# Patient Record
Sex: FEMALE | Race: WHITE | NOT HISPANIC OR LATINO | Employment: FULL TIME | URBAN - METROPOLITAN AREA
[De-identification: names, ages, dates, MRNs, and addresses within clinical notes are randomized per-mention and may not be internally consistent; named-entity substitution may affect disease eponyms.]

---

## 2018-04-17 ENCOUNTER — APPOINTMENT (EMERGENCY)
Dept: RADIOLOGY | Facility: HOSPITAL | Age: 23
End: 2018-04-17
Payer: COMMERCIAL

## 2018-04-17 ENCOUNTER — HOSPITAL ENCOUNTER (EMERGENCY)
Facility: HOSPITAL | Age: 23
Discharge: HOME | End: 2018-04-17
Admitting: EMERGENCY MEDICINE
Payer: COMMERCIAL

## 2018-04-17 VITALS
SYSTOLIC BLOOD PRESSURE: 139 MMHG | WEIGHT: 120 LBS | OXYGEN SATURATION: 100 % | BODY MASS INDEX: 20.49 KG/M2 | DIASTOLIC BLOOD PRESSURE: 80 MMHG | HEART RATE: 104 BPM | RESPIRATION RATE: 18 BRPM | HEIGHT: 64 IN

## 2018-04-17 DIAGNOSIS — S52.501A DISTAL RADIUS FRACTURE, RIGHT: Primary | ICD-10-CM
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
## 2018-04-17 LAB — EXT PREG TEST URINE: NORMAL

## 2018-04-17 PROCEDURE — 99284 EMERGENCY DEPT VISIT MOD MDM: CPT

## 2018-04-17 PROCEDURE — 81025 URINE PREGNANCY TEST: CPT | Performed by: PHYSICIAN ASSISTANT

## 2018-04-17 PROCEDURE — 73110 X-RAY EXAM OF WRIST: CPT

## 2018-04-17 RX ORDER — ALBUTEROL SULFATE 90 UG/1
2 AEROSOL, METERED RESPIRATORY (INHALATION) EVERY 6 HOURS PRN
COMMUNITY
End: 2020-12-03 | Stop reason: ALTCHOICE
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
## 2018-04-18 ENCOUNTER — OFFICE VISIT (OUTPATIENT)
Dept: OBGYN CLINIC | Facility: CLINIC | Age: 23
End: 2018-04-18
Payer: COMMERCIAL

## 2018-04-18 VITALS
DIASTOLIC BLOOD PRESSURE: 72 MMHG | WEIGHT: 120 LBS | SYSTOLIC BLOOD PRESSURE: 116 MMHG | BODY MASS INDEX: 19.99 KG/M2 | HEART RATE: 80 BPM | HEIGHT: 65 IN

## 2018-04-18 DIAGNOSIS — S52.501A CLOSED TRAUMATIC NONDISPLACED FRACTURE OF DISTAL END OF RIGHT RADIUS, INITIAL ENCOUNTER: Primary | ICD-10-CM

## 2018-04-18 PROCEDURE — 29075 APPL CST ELBW FNGR SHORT ARM: CPT | Performed by: PHYSICIAN ASSISTANT

## 2018-04-18 PROCEDURE — 25600 CLTX DST RDL FX/EPHYS SEP WO: CPT | Performed by: PHYSICIAN ASSISTANT

## 2018-04-18 PROCEDURE — 99203 OFFICE O/P NEW LOW 30 MIN: CPT | Performed by: ORTHOPAEDIC SURGERY

## 2018-04-18 RX ORDER — ALBUTEROL SULFATE 90 UG/1
POWDER, METERED RESPIRATORY (INHALATION)
COMMUNITY
Start: 2018-01-23 | End: 2020-12-03 | Stop reason: ALTCHOICE
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
## 2018-04-18 NOTE — ASSESSMENT & PLAN NOTE
Findings consistent with right nondisplaced intra-articular distal radius fracture  Findings and treatment options were discussed with the patient  Short-arm cast was applied today by Dr Liss Andrea  Cast instructions were given  Continue ice, elevation and NSAIDs as needed  Follow-up in 6 weeks, out of cast and x-rays  All questions were answered to patient's satisfaction

## 2018-05-30 ENCOUNTER — OFFICE VISIT (OUTPATIENT)
Dept: OBGYN CLINIC | Facility: CLINIC | Age: 23
End: 2018-05-30

## 2018-05-30 ENCOUNTER — APPOINTMENT (OUTPATIENT)
Dept: RADIOLOGY | Facility: CLINIC | Age: 23
End: 2018-05-30
Payer: COMMERCIAL

## 2018-05-30 ENCOUNTER — OFFICE VISIT (OUTPATIENT)
Dept: OCCUPATIONAL THERAPY | Facility: CLINIC | Age: 23
End: 2018-05-30
Payer: COMMERCIAL

## 2018-05-30 VITALS
HEART RATE: 84 BPM | SYSTOLIC BLOOD PRESSURE: 116 MMHG | DIASTOLIC BLOOD PRESSURE: 78 MMHG | HEIGHT: 65 IN | WEIGHT: 120 LBS | BODY MASS INDEX: 19.99 KG/M2

## 2018-05-30 DIAGNOSIS — S52.501D CLOSED TRAUMATIC NONDISPLACED FRACTURE OF DISTAL END OF RIGHT RADIUS WITH ROUTINE HEALING, SUBSEQUENT ENCOUNTER: Primary | ICD-10-CM

## 2018-05-30 DIAGNOSIS — S62.101S WRIST FRACTURE, CLOSED, RIGHT, SEQUELA: ICD-10-CM

## 2018-05-30 PROCEDURE — L3906 WHO W/O JOINTS CF: HCPCS | Performed by: OCCUPATIONAL THERAPIST

## 2018-05-30 PROCEDURE — 99024 POSTOP FOLLOW-UP VISIT: CPT | Performed by: ORTHOPAEDIC SURGERY

## 2018-05-30 PROCEDURE — G8992 OTHER PT/OT  D/C STATUS: HCPCS | Performed by: OCCUPATIONAL THERAPIST

## 2018-05-30 PROCEDURE — G8991 OTHER PT/OT GOAL STATUS: HCPCS | Performed by: OCCUPATIONAL THERAPIST

## 2018-05-30 PROCEDURE — 73110 X-RAY EXAM OF WRIST: CPT

## 2018-05-30 PROCEDURE — G8990 OTHER PT/OT CURRENT STATUS: HCPCS | Performed by: OCCUPATIONAL THERAPIST

## 2018-05-30 NOTE — PROGRESS NOTES
Splint     Diagnosis:   1  Closed traumatic nondisplaced fracture of distal end of right radius with routine healing, subsequent encounter       Indication: Fracture    Location: Right  wrist  Supplies: Skin coverage   Splint type: Wrist splint  Wearing Schedule: off for hygeine and HEP  Describe Position: neutral    Precautions: Fracture    Patient or Caregiver expresses understanding of wearing Schedule and Precautions? Yes  Patient or Caregiver able to don/doff orthotic independently? Yes    Written orders provided to patient?  Yes  Orders Obtained: Written  Orders Obtained from: Dr Emili Chatman    Return for evaluation and treatment Yes

## 2018-05-30 NOTE — ASSESSMENT & PLAN NOTE
Findings consistent with right distal radius nondisplaced fracture  Discussed findings and treatment options with the patient  I have review patient's right wrist x-ray with her and her mother  Discontinue short-arm cast   Place patient in a volar splint  Start occupational therapy to rehabilitate her right wrist  Limit work activity using the right hand in lifting  Re-evaluate in 4-6 weeks  All patient's questions were answered to his satisfaction  This note is created using dictation transcription  It may contain typographical errors, grammatical errors, improperly dictated words, background noise and other errors
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
## 2018-05-31 ENCOUNTER — EVALUATION (OUTPATIENT)
Dept: OCCUPATIONAL THERAPY | Facility: CLINIC | Age: 23
End: 2018-05-31
Payer: COMMERCIAL

## 2018-05-31 DIAGNOSIS — S52.501D CLOSED TRAUMATIC NONDISPLACED FRACTURE OF DISTAL END OF RIGHT RADIUS WITH ROUTINE HEALING, SUBSEQUENT ENCOUNTER: Primary | ICD-10-CM

## 2018-05-31 PROCEDURE — 97165 OT EVAL LOW COMPLEX 30 MIN: CPT | Performed by: OCCUPATIONAL THERAPIST
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
## 2018-06-06 ENCOUNTER — OFFICE VISIT (OUTPATIENT)
Dept: OCCUPATIONAL THERAPY | Facility: CLINIC | Age: 23
End: 2018-06-06
Payer: COMMERCIAL

## 2018-06-06 DIAGNOSIS — S52.501D CLOSED TRAUMATIC NONDISPLACED FRACTURE OF DISTAL END OF RIGHT RADIUS WITH ROUTINE HEALING, SUBSEQUENT ENCOUNTER: Primary | ICD-10-CM

## 2018-06-06 PROCEDURE — 97140 MANUAL THERAPY 1/> REGIONS: CPT | Performed by: OCCUPATIONAL THERAPIST

## 2018-06-06 PROCEDURE — 97022 WHIRLPOOL THERAPY: CPT | Performed by: OCCUPATIONAL THERAPIST

## 2018-06-06 PROCEDURE — 97110 THERAPEUTIC EXERCISES: CPT | Performed by: OCCUPATIONAL THERAPIST

## 2018-06-06 NOTE — PROGRESS NOTES
Daily Note     Today's date: 2018  Patient name: Dora Sheikh  : 1995  MRN: [id number]  Referring provider: Junaid Zazueta MD  Dx:   Encounter Diagnosis     ICD-10-CM    1  Closed traumatic nondisplaced fracture of distal end of right radius with routine healing, subsequent encounter S52 501D                   Subjective: I am doing better      Objective: See treatment diary below      Assessment: Tolerated treatment well  Patient has improved ROM  Plan: Continue per plan of care            Daily Treatment Diary     Manual             graston wrist 3m 3m           Grade 1 MOB  3m Grade2  3M           MFR  2m                                         Exercise Diary             Wrist ROM x10 x10           P/S x10 x10           Powerweb  Red  3x10 Green  3x10           Pinch pins Red  3x6 Green  3x6           Wrist maze x5 x5           Wrist ROM w/cone 2x10 3x10                                                                                                                                                                                                     Modalities               pre 10m            fluido  10m

## 2018-06-08 ENCOUNTER — OFFICE VISIT (OUTPATIENT)
Dept: OCCUPATIONAL THERAPY | Facility: CLINIC | Age: 23
End: 2018-06-08
Payer: COMMERCIAL

## 2018-06-08 DIAGNOSIS — S52.501D CLOSED TRAUMATIC NONDISPLACED FRACTURE OF DISTAL END OF RIGHT RADIUS WITH ROUTINE HEALING, SUBSEQUENT ENCOUNTER: Primary | ICD-10-CM

## 2018-06-08 PROCEDURE — 97140 MANUAL THERAPY 1/> REGIONS: CPT | Performed by: OCCUPATIONAL THERAPIST

## 2018-06-08 PROCEDURE — 97022 WHIRLPOOL THERAPY: CPT | Performed by: OCCUPATIONAL THERAPIST

## 2018-06-08 PROCEDURE — 97110 THERAPEUTIC EXERCISES: CPT | Performed by: OCCUPATIONAL THERAPIST

## 2018-06-08 NOTE — PROGRESS NOTES
Daily Note     Today's date: 2018  Patient name: Glo Stiles  : 1995  MRN: [id number]  Referring provider: Debrah Ahumada, MD  Dx:   Encounter Diagnosis     ICD-10-CM    1  Closed traumatic nondisplaced fracture of distal end of right radius with routine healing, subsequent encounter S52 501D                   Subjective: I am doing  Better       Objective: See treatment diary below      Assessment: Tolerated treatment well  Patient has improve ROM   Plan: Continue per plan of care            Daily Treatment Diary     Manual            graston wrist 3m 3m 3          Grade 1 MOB  3m Grade2  3M Grade2  3m          MFR  2m 2m                                        Exercise Diary            Wrist ROM x10 x10 x10          P/S x10 x10 x10          Powerweb  Red  3x10 Green  3x10 [id number]          Pinch pins Red  3x6 Green  3x6 Green  3x6          Wrist maze x5 x5 x5          Wrist ROM w/cone 2x10 3x10 3x10                                                                                                                                                                                                    Modalities             MH pre 10m            fluido  10m

## 2018-06-13 ENCOUNTER — APPOINTMENT (OUTPATIENT)
Dept: OCCUPATIONAL THERAPY | Facility: CLINIC | Age: 23
End: 2018-06-13
Payer: COMMERCIAL

## 2018-06-14 ENCOUNTER — OFFICE VISIT (OUTPATIENT)
Dept: OCCUPATIONAL THERAPY | Facility: CLINIC | Age: 23
End: 2018-06-14
Payer: COMMERCIAL

## 2018-06-14 ENCOUNTER — APPOINTMENT (OUTPATIENT)
Dept: OCCUPATIONAL THERAPY | Facility: CLINIC | Age: 23
End: 2018-06-14
Payer: COMMERCIAL

## 2018-06-14 DIAGNOSIS — S52.501D CLOSED TRAUMATIC NONDISPLACED FRACTURE OF DISTAL END OF RIGHT RADIUS WITH ROUTINE HEALING, SUBSEQUENT ENCOUNTER: Primary | ICD-10-CM

## 2018-06-14 PROCEDURE — 97140 MANUAL THERAPY 1/> REGIONS: CPT | Performed by: OCCUPATIONAL THERAPIST

## 2018-06-14 PROCEDURE — 97022 WHIRLPOOL THERAPY: CPT | Performed by: OCCUPATIONAL THERAPIST

## 2018-06-14 PROCEDURE — 97110 THERAPEUTIC EXERCISES: CPT | Performed by: OCCUPATIONAL THERAPIST

## 2018-06-14 NOTE — PROGRESS NOTES
Daily Note     Today's date: 2018  Patient name: Constantino Codroba  : 1995  MRN: [id number]  Referring provider: Buck Mauro MD  Dx:   Encounter Diagnosis     ICD-10-CM    1  Closed traumatic nondisplaced fracture of distal end of right radius with routine healing, subsequent encounter S52 501D                   Subjective: " I can do everything but heavy lifting"  " It's moving well and I don't have any pain"      Objective: See treatment diary below    R  44# and left   58#  Handle two  Manual            graston wrist 3m 3m 3          Grade 1 MOB  3m Grade2  3M Grade2  3m          MFR  2m 2m                                        Exercise Diary            Wrist ROM x10 x10 x10 X 10         P/S x10 x10 x10 x10         Powerweb  Red  3x10 Green  3x10 [id number] Black  3x 01         Pinch pins Red  3x6 Green  3x6 Green  3x6 Blue/Black 2 x 10         Wrist maze x5 x5 x5 x5         Wrist ROM w/cone 2x10 3x10 3x10 defrred         digi flex    Green 3 x10                                                                                                                                                        6/                              Modalities   5           MH pre 10m            fluido  10m 10                                       Assessment: Tolerated treatment well  Patient would benefit from continued OT      Plan: Continue per plan of care

## 2018-06-15 ENCOUNTER — APPOINTMENT (OUTPATIENT)
Dept: OCCUPATIONAL THERAPY | Facility: CLINIC | Age: 23
End: 2018-06-15
Payer: COMMERCIAL

## 2018-06-15 ENCOUNTER — OFFICE VISIT (OUTPATIENT)
Dept: OCCUPATIONAL THERAPY | Facility: CLINIC | Age: 23
End: 2018-06-15
Payer: COMMERCIAL

## 2018-06-15 DIAGNOSIS — S52.501D CLOSED TRAUMATIC NONDISPLACED FRACTURE OF DISTAL END OF RIGHT RADIUS WITH ROUTINE HEALING, SUBSEQUENT ENCOUNTER: Primary | ICD-10-CM

## 2018-06-15 PROCEDURE — 97140 MANUAL THERAPY 1/> REGIONS: CPT | Performed by: OCCUPATIONAL THERAPIST

## 2018-06-15 PROCEDURE — 97110 THERAPEUTIC EXERCISES: CPT | Performed by: OCCUPATIONAL THERAPIST

## 2018-06-15 PROCEDURE — 97022 WHIRLPOOL THERAPY: CPT | Performed by: OCCUPATIONAL THERAPIST

## 2018-06-15 NOTE — PROGRESS NOTES
Daily Note     Today's date: 6/15/2018  Patient name: Vianca Estrada  : 1995  MRN: [id number]  Referring provider: Yohannes Jaquez MD  Dx:   Encounter Diagnosis     ICD-10-CM    1  Closed traumatic nondisplaced fracture of distal end of right radius with routine healing, subsequent encounter S52 501D                   Subjective: " I want to start wearing the splint less "      Objective: See treatment diary below    R  44# and left   58#  Handle two  Manual  5/31 6/6 6/8 6/14 6/15        graston wrist 3m 3m 3  3 min        Grade 1 MOB  3m Grade2  3M Grade2  3m  3 min        MFR  2m 2m  2min                                      Exercise Diary  5/31 6/6 6/8 6/14 6/15        Wrist ROM x10 x10 x10 X 10 x10        P/S x10 x10 x10 x10 x10        Powerweb  Red  3x10 Green  3x10 [id number] Black  3x 01 Black x30        Pinch pins Red  3x6 Green  3x6 Green  3x6 Blue/Black 2 x 10 Black/Blue x3        Wrist maze x5 x5 x5 x5         Wrist ROM w/cone 2x10 3x10 3x10 defrred #2 3x10        digi flex    Green 3 x10         P/s therabar     Red x30                                                                                                                                                                        Modalities  5/31 5/8   6/15        MH pre 10m            fluido  10m 10  10 min                                     Assessment: Tolerated treatment well  Patient would benefit from continued OT      Plan: Continue per plan of care

## 2018-06-27 ENCOUNTER — OFFICE VISIT (OUTPATIENT)
Dept: OCCUPATIONAL THERAPY | Facility: CLINIC | Age: 23
End: 2018-06-27
Payer: COMMERCIAL

## 2018-06-27 DIAGNOSIS — S52.501D CLOSED TRAUMATIC NONDISPLACED FRACTURE OF DISTAL END OF RIGHT RADIUS WITH ROUTINE HEALING, SUBSEQUENT ENCOUNTER: Primary | ICD-10-CM

## 2018-06-27 PROCEDURE — 97110 THERAPEUTIC EXERCISES: CPT | Performed by: OCCUPATIONAL THERAPIST

## 2018-06-27 PROCEDURE — G8990 OTHER PT/OT CURRENT STATUS: HCPCS | Performed by: OCCUPATIONAL THERAPIST

## 2018-06-27 PROCEDURE — G8991 OTHER PT/OT GOAL STATUS: HCPCS | Performed by: OCCUPATIONAL THERAPIST

## 2018-06-27 PROCEDURE — 97140 MANUAL THERAPY 1/> REGIONS: CPT | Performed by: OCCUPATIONAL THERAPIST

## 2018-06-27 NOTE — PROGRESS NOTES
Daily Note     Today's date: 2018  Patient name: Adrian Hill  : 1995  MRN: [id number]  Referring provider: Eula Machado MD  Dx:   Encounter Diagnosis     ICD-10-CM    1  Closed traumatic nondisplaced fracture of distal end of right radius with routine healing, subsequent encounter S52 501D                   Subjective: I am doing better      Objective: See treatment diary below        Manual  5/31 6/6 6/8 6/14 6/15 6/27       graston wrist 3m 3m 3  3 min 3m       Grade 1 MOB  3m Grade2  3M Grade2  3m  3 min Grade 3  3m       MFR  2m 2m  2min 2m                                     Exercise Diary  5/31 6/6 6/8 6/14 6/15 6/27       Wrist ROM x10 x10 x10 X 10 x10 X10       P/S x10 x10 x10 x10 x10 x10       Powerweb  Red  3x10 Green  3x10 [id number] Black  3x 01 Black x30 Black   x30       Pinch pins Red  3x6 Green  3x6 Green  3x6 Blue/Black 2 x 10 Black/Blue x3 Black  x30       Wrist maze x5 x5 x5 x5         Wrist ROM w/cone 2x10 3x10 3x10 defrred #2 3x10 3#  3x10       digi flex    Green 3 x10         P/s therabar     Red x30 Red   x30                                                                                                                                                                       Modalities  5/31 5/8   6/15 6/27       MH pre 10m     10m       fluido  10m 10  10 min                           Assessment: Tolerated treatment well  Patient has improved ROM   She has improved use R for ADL and work        Plan: upgrade as onelia

## 2018-06-29 ENCOUNTER — OFFICE VISIT (OUTPATIENT)
Dept: OCCUPATIONAL THERAPY | Facility: CLINIC | Age: 23
End: 2018-06-29
Payer: COMMERCIAL

## 2018-06-29 DIAGNOSIS — S52.501D CLOSED TRAUMATIC NONDISPLACED FRACTURE OF DISTAL END OF RIGHT RADIUS WITH ROUTINE HEALING, SUBSEQUENT ENCOUNTER: Primary | ICD-10-CM

## 2018-06-29 PROCEDURE — 97110 THERAPEUTIC EXERCISES: CPT | Performed by: OCCUPATIONAL THERAPIST

## 2018-06-29 PROCEDURE — 97010 HOT OR COLD PACKS THERAPY: CPT | Performed by: OCCUPATIONAL THERAPIST

## 2018-06-29 PROCEDURE — 97140 MANUAL THERAPY 1/> REGIONS: CPT | Performed by: OCCUPATIONAL THERAPIST

## 2018-06-29 NOTE — PROGRESS NOTES
Daily Note     Today's date: 2018  Patient name: China Stone  : 1995  MRN: [id number]  Referring provider: Trina Miranda MD  Dx:   Encounter Diagnosis     ICD-10-CM    1  Closed traumatic nondisplaced fracture of distal end of right radius with routine healing, subsequent encounter S52 501D                   Subjective: I am doing better      Objective: See treatment diary below        Manual  5/31 6/6 6/8 6/14 6/15 6/27 6/29      graston wrist 3m 3m 3  3 min 3m 3m      Grade 1 MOB  3m Grade2  3M Grade2  3m  3 min Grade 3  3m 3m      MFR  2m 2m  2min 2m 2m                                    Exercise Diary  5/31 6/6 6/8 6/14 6/15 6/27 6/29      Wrist ROM x10 x10 x10 X 10 x10 X10 x10      P/S x10 x10 x10 x10 x10 x10 x10      Powerweb  Red  3x10 Green  3x10 [id number] Black  3x 01 Black x30 Black   x30 Black x30      Pinch pins Red  3x6 Green  3x6 Green  3x6 Blue/Black 2 x 10 Black/Blue x3 Black  x30 Black x30      Wrist maze x5 x5 x5 x5         Wrist ROM w/cone 2x10 3x10 3x10 defrred #2 3x10 3#  3x10 #3 3x10      digi flex    Green 3 x10         P/s therabar     Red x30 Red   x30 Green x30      ext       Red/green bandsx2                                                                                                                                                         Modalities  5/31 5/8   6/15 6/27 6/29      MH pre 10m     10m       fluido  10m 10  10 min  10min                         Assessment: Tolerated treatment well  Patient has improved ROM   She has improved use R for ADL and work        Plan: upgrade as onelia

## 2018-07-03 ENCOUNTER — OFFICE VISIT (OUTPATIENT)
Dept: OCCUPATIONAL THERAPY | Facility: CLINIC | Age: 23
End: 2018-07-03
Payer: COMMERCIAL

## 2018-07-03 DIAGNOSIS — S52.501D CLOSED TRAUMATIC NONDISPLACED FRACTURE OF DISTAL END OF RIGHT RADIUS WITH ROUTINE HEALING, SUBSEQUENT ENCOUNTER: Primary | ICD-10-CM

## 2018-07-03 PROCEDURE — 97110 THERAPEUTIC EXERCISES: CPT | Performed by: OCCUPATIONAL THERAPIST

## 2018-07-03 PROCEDURE — 97022 WHIRLPOOL THERAPY: CPT | Performed by: OCCUPATIONAL THERAPIST

## 2018-07-03 PROCEDURE — 97140 MANUAL THERAPY 1/> REGIONS: CPT | Performed by: OCCUPATIONAL THERAPIST

## 2018-07-03 NOTE — PROGRESS NOTES
Daily Note     Today's date: 7/3/2018  Patient name: Juan C García  : 1995  MRN: [id number]  Referring provider: Sunita Perez MD  Dx:   Encounter Diagnosis     ICD-10-CM    1  Closed traumatic nondisplaced fracture of distal end of right radius with routine healing, subsequent encounter S52 501D                   Subjective: I am doing better , difficulty  With heavy lifting      Objective: See treatment diary below      Assessment: Tolerated treatment well  Patient has improved ROM and strength   Plan: Continue per plan of care            Manual  5/31 6/6 6/8 6/14 6/15 6/27 7/3      graston wrist 3m 3m 3  3 min 3m 3m      Grade 1 MOB  3m Grade2  3M Grade2  3m  3 min Grade 3  3m Grade 3  3m      MFR  2m 2m  2min 2m 2m                                    Exercise Diary  5/31 6/6 6/8 6/14 6/15 6/27 7/3      Wrist ROM x10 x10 x10 X 10 x10 X10 x10      P/S x10 x10 x10 x10 x10 x10 x10      Powerweb  Red  3x10 Green  3x10 [id number] Black  3x 01 Black x30 Black   x30 Black   x30      Pinch pins Red  3x6 Green  3x6 Green  3x6 Blue/Black 2 x 10 Black/Blue x3 Black  x30 Black  x30      Wrist maze x5 x5 x5 x5         Wrist ROM w/cone 2x10 3x10 3x10 defrred #2 3x10 3#  3x10 3#  3x10      digi flex    Green 3 x10         P/s therabar     Red x30 Red   x30 Red  x30      theraband row       OR  3x10      Theraband ext       OR  3x10                                                                                                              /                              Modalities  5/31 5/8   6/15 6/27 7/3      MH pre 10m     10m       fluido  10m 10  10 min  10m

## 2018-07-06 ENCOUNTER — OFFICE VISIT (OUTPATIENT)
Dept: OCCUPATIONAL THERAPY | Facility: CLINIC | Age: 23
End: 2018-07-06
Payer: COMMERCIAL

## 2018-07-06 DIAGNOSIS — S52.501D CLOSED TRAUMATIC NONDISPLACED FRACTURE OF DISTAL END OF RIGHT RADIUS WITH ROUTINE HEALING, SUBSEQUENT ENCOUNTER: Primary | ICD-10-CM

## 2018-07-06 PROCEDURE — 97110 THERAPEUTIC EXERCISES: CPT | Performed by: OCCUPATIONAL THERAPIST

## 2018-07-06 PROCEDURE — 97140 MANUAL THERAPY 1/> REGIONS: CPT | Performed by: OCCUPATIONAL THERAPIST

## 2018-07-06 PROCEDURE — 97022 WHIRLPOOL THERAPY: CPT | Performed by: OCCUPATIONAL THERAPIST

## 2018-07-06 NOTE — PROGRESS NOTES
Daily Note     Today's date: 2018  Patient name: Elizabeth Wong  : 1995  MRN: [id number]  Referring provider: Ame Holder MD  Dx:   Encounter Diagnosis     ICD-10-CM    1  Closed traumatic nondisplaced fracture of distal end of right radius with routine healing, subsequent encounter S52 501D                   Subjective: I am doing better      Objective: See treatment diary below          Manual  5/31 6/6 6/8 6/14 6/15 6/27 7/3 7     graston wrist 3m 3m 3  3 min 3m 3m 3m     Grade 1 MOB  3m Grade2  3M Grade2  3m  3 min Grade 3  3m Grade 3  3m 3m     MFR  2m 2m  2min 2m 2m 2m                                   Exercise Diary  5/31 6/6 6/8 6/14 6/15 6/27 7/3 7     Wrist ROM x10 x10 x10 X 10 x10 X10 x10 x10     P/S x10 x10 x10 x10 x10 x10 x10 x10     Powerweb  Red  3x10 Green  3x10 [id number] Black  3x 01 Black x30 Black   x30 Black   x30 Black  3x10     Pinch pins Red  3x6 Green  3x6 Green  3x6 Blue/Black 2 x 10 Black/Blue x3 Black  x30 Black  x30 Black 3x10     Wrist maze x5 x5 x5 x5         Wrist ROM w/cone 2x10 3x10 3x10 defrred #2 3x10 3#  3x10 3#  3x10 3#  3x10     digi flex    Green 3 x10         P/s therabar     Red x30 Red   x30 Red  x30 Green  x30     theraband row       OR  3x10 Green  3x10     Theraband ext       OR  3x10 Green  3x10     Biceps curl        5#  3x10     Box lift         14#  3x10                                                                                   /                              Modalities  5/31 5/8   6/15 6/27 7/3 7/6      pre 10m     10m       fluido  10m 10  10 min  10m 10m                            Assessment: Tolerated treatment well  Patient remains limited with heavier lifting at work        Plan: ankur

## 2018-07-10 ENCOUNTER — OFFICE VISIT (OUTPATIENT)
Dept: OCCUPATIONAL THERAPY | Facility: CLINIC | Age: 23
End: 2018-07-10
Payer: COMMERCIAL

## 2018-07-10 DIAGNOSIS — S52.501D CLOSED TRAUMATIC NONDISPLACED FRACTURE OF DISTAL END OF RIGHT RADIUS WITH ROUTINE HEALING, SUBSEQUENT ENCOUNTER: Primary | ICD-10-CM

## 2018-07-10 PROCEDURE — G8990 OTHER PT/OT CURRENT STATUS: HCPCS | Performed by: OCCUPATIONAL THERAPIST

## 2018-07-10 PROCEDURE — 97022 WHIRLPOOL THERAPY: CPT | Performed by: OCCUPATIONAL THERAPIST

## 2018-07-10 PROCEDURE — 97110 THERAPEUTIC EXERCISES: CPT | Performed by: OCCUPATIONAL THERAPIST

## 2018-07-10 PROCEDURE — G8991 OTHER PT/OT GOAL STATUS: HCPCS | Performed by: OCCUPATIONAL THERAPIST

## 2018-07-10 PROCEDURE — 97140 MANUAL THERAPY 1/> REGIONS: CPT | Performed by: OCCUPATIONAL THERAPIST
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
## 2018-07-11 ENCOUNTER — OFFICE VISIT (OUTPATIENT)
Dept: OBGYN CLINIC | Facility: CLINIC | Age: 23
End: 2018-07-11

## 2018-07-11 VITALS
DIASTOLIC BLOOD PRESSURE: 70 MMHG | HEIGHT: 65 IN | BODY MASS INDEX: 19.66 KG/M2 | HEART RATE: 74 BPM | WEIGHT: 118 LBS | SYSTOLIC BLOOD PRESSURE: 118 MMHG

## 2018-07-11 DIAGNOSIS — S52.501D CLOSED TRAUMATIC NONDISPLACED FRACTURE OF DISTAL END OF RIGHT RADIUS WITH ROUTINE HEALING, SUBSEQUENT ENCOUNTER: Primary | ICD-10-CM

## 2018-07-11 PROCEDURE — 99024 POSTOP FOLLOW-UP VISIT: CPT | Performed by: ORTHOPAEDIC SURGERY

## 2018-07-11 NOTE — ASSESSMENT & PLAN NOTE
Heal right distal radius fracture  I recommend patient to finish her hand therapy treatment  Continue home exercises  I advised patient to contact me if her symptoms persist or worsen, otherwise I will see patient back as needed  All patient's questions were answered to her satisfaction  This note is created using dictation transcription  It may contain typographical errors, grammatical errors, improperly dictated words, background noise and other errors

## 2018-07-12 ENCOUNTER — OFFICE VISIT (OUTPATIENT)
Dept: OCCUPATIONAL THERAPY | Facility: CLINIC | Age: 23
End: 2018-07-12
Payer: COMMERCIAL

## 2018-07-12 DIAGNOSIS — S52.501D CLOSED TRAUMATIC NONDISPLACED FRACTURE OF DISTAL END OF RIGHT RADIUS WITH ROUTINE HEALING, SUBSEQUENT ENCOUNTER: Primary | ICD-10-CM

## 2018-07-12 PROCEDURE — 97110 THERAPEUTIC EXERCISES: CPT | Performed by: OCCUPATIONAL THERAPIST

## 2018-07-12 PROCEDURE — 97022 WHIRLPOOL THERAPY: CPT | Performed by: OCCUPATIONAL THERAPIST

## 2018-07-12 PROCEDURE — 97112 NEUROMUSCULAR REEDUCATION: CPT | Performed by: OCCUPATIONAL THERAPIST

## 2018-07-12 NOTE — PROGRESS NOTES
Daily Note     Today's date: 2018  Patient name: Vianca Estrada  : 1995  MRN: [id number]  Referring provider: Yohannes Jaquez MD  Dx:   Encounter Diagnosis     ICD-10-CM    1  Closed traumatic nondisplaced fracture of distal end of right radius with routine healing, subsequent encounter S52 501D                   Subjective: I am doing  Better       Objective: See treatment diary below            Manual  5/31 6/6 6/8 6/14 6/15 6/27 7/3 7/6 7/9 7/12   graston wrist 3m 3m 3  3 min 3m 3m 3m 3m 3m   Grade 1 MOB  3m Grade2  3M Grade2  3m  3 min Grade 3  3m Grade 3  3m 3m 3m 3m   MFR  2m 2m  2min 2m 2m 2m 2m 2m                                 Exercise Diary  5/31 6/6 6/8 6/14 6/15 6/27 7/3 7/6 7/10 7/12    Wrist ROM x10 x10 x10 X 10 x10 X10 x10 x10 x10 x10   P/S x10 x10 x10 x10 x10 x10 x10 x10 x10 x10   Powerweb  Red  3x10 Green  3x10 [id number] Black  3x 01 Black x30 Black   x30 Black   x30 Black  3x10 Black 3x10 Black   3x10   Pinch pins Red  3x6 Green  3x6 Green  3x6 Blue/Black 2 x 10 Black/Blue x3 Black  x30 Black  x30 Black 3x10 Black 3x10 Black  3x10   Wrist maze x5 x5 x5 x5         Wrist ROM w/cone 2x10 3x10 3x10 defrred #2 3x10 3#  3x10 3#  3x10 3#  3x10 3#  3x10 4#  3x10   digi flex    Green 3 x10         P/s therabar     Red x30 Red   x30 Red  x30 Green  x30 Green x30 Green  x30   theraband row       OR  3x10 Green  3x10 Green  3x10 Green  3x10   Theraband ext       OR  3x10 Green  3x10 Green 3x10 Green  3x10   Biceps curl        5#  3x10 7#  3x10 7 5#kb  3x10   Box lift         14#  3x10 15 #  3x10 15#  3x10                                                                                 /                              Modalities  5/31 5/8   6/15 6/27 7/3 7/6 7/10 7/12   MH pre 10m     10m       fluido  10m 10  10 min  10m 10m 10m 10m                            Assessment: Tolerated treatment well  Patient has improved use R for ADL and work        Plan: as needed

## 2018-07-18 ENCOUNTER — OFFICE VISIT (OUTPATIENT)
Dept: OCCUPATIONAL THERAPY | Facility: CLINIC | Age: 23
End: 2018-07-18
Payer: COMMERCIAL

## 2018-07-18 DIAGNOSIS — S52.501D CLOSED TRAUMATIC NONDISPLACED FRACTURE OF DISTAL END OF RIGHT RADIUS WITH ROUTINE HEALING, SUBSEQUENT ENCOUNTER: Primary | ICD-10-CM

## 2018-07-18 PROCEDURE — 97022 WHIRLPOOL THERAPY: CPT

## 2018-07-18 PROCEDURE — 97110 THERAPEUTIC EXERCISES: CPT

## 2018-07-18 NOTE — PROGRESS NOTES
Daily Note     Today's date: 2018  Patient name: Constantino Cordoba  : 1995  MRN: [id number]  Referring provider: Buck Mauro MD  Dx:   Encounter Diagnosis     ICD-10-CM    1  Closed traumatic nondisplaced fracture of distal end of right radius with routine healing, subsequent encounter S52 501D                   Subjective: I am doing better  I just need to get stronger        Objective: See treatment diary below            Manual  5/31 6/6 6/8 6/14 6/15 6/27 7/3 7/6 7/9 7/12 7/18   graston wrist 3m 3m 3  3 min 3m 3m 3m 3m 3m    Grade 1 MOB  3m Grade2  3M Grade2  3m  3 min Grade 3  3m Grade 3  3m 3m 3m 3m 3min   MFR  2m 2m  2min 2m 2m 2m 2m 2m 2min                                                 Exercise Diary  5/31 6/6 6/8 6/14 6/15 6/27 7/3 7/6 7/10 7/12  7/18   Wrist ROM x10 x10 x10 X 10 x10 X10 x10 x10 x10 x10 x10   P/S x10 x10 x10 x10 x10 x10 x10 x10 x10 x10 x10   Powerweb  Red  3x10 Green  3x10 [id number] Black  3x 01 Black x30 Black   x30 Black   x30 Black  3x10 Black 3x10 Black   3x10 Black 3x10   Pinch pins Red  3x6 Green  3x6 Green  3x6 Blue/Black 2 x 10 Black/Blue x3 Black  x30 Black  x30 Black 3x10 Black 3x10 Black  3x10 Black 3x10   Wrist maze x5 x5 x5 x5          Wrist ROM w/cone 2x10 3x10 3x10 defrred #2 3x10 3#  3x10 3#  3x10 3#  3x10 3#  3x10 4#  3x10 4# 3x10   digi flex    Green 3 x10          P/s therabar     Red x30 Red   x30 Red  x30 Green  x30 Green x30 Green  x30 Green 30x   theraband row       OR  3x10 Green  3x10 Green  3x10 Green  3x10    Theraband ext       OR  3x10 Green  3x10 Green 3x10 Green  3x10    Biceps curl        5#  3x10 7#  3x10 7 5#kb  3x10    Box lift         14#  3x10 15 #  3x10 15#  3x10                                                                                                                        Modalities  5/31 5/8   6/15 6/27 7/3 7/6 7/10 7/12 7/18    pre 10m     10m        fluido  10m 10  10 min  10m 10m 10m 10m 10min Assessment: Tolerated treatment well  Patient reports improving functional use of RUE for ADLs and work tasks, but strength is still limited  Noted extrinsic extensor tightness  Plan: Progress treatment as tolerated  Continue for strengthening

## 2018-07-24 ENCOUNTER — OFFICE VISIT (OUTPATIENT)
Dept: OCCUPATIONAL THERAPY | Facility: CLINIC | Age: 23
End: 2018-07-24
Payer: COMMERCIAL

## 2018-07-24 DIAGNOSIS — S52.501D CLOSED TRAUMATIC NONDISPLACED FRACTURE OF DISTAL END OF RIGHT RADIUS WITH ROUTINE HEALING, SUBSEQUENT ENCOUNTER: Primary | ICD-10-CM

## 2018-07-24 PROCEDURE — 97110 THERAPEUTIC EXERCISES: CPT | Performed by: OCCUPATIONAL THERAPIST

## 2018-07-24 PROCEDURE — 97140 MANUAL THERAPY 1/> REGIONS: CPT | Performed by: OCCUPATIONAL THERAPIST

## 2018-07-24 PROCEDURE — 97010 HOT OR COLD PACKS THERAPY: CPT | Performed by: OCCUPATIONAL THERAPIST

## 2018-07-24 NOTE — PROGRESS NOTES
Daily Note     Today's date: 2018  Patient name: Jarrell Triana  : 1995  MRN: [id number]  Referring provider: Laura Martinez MD  Dx:   Encounter Diagnosis     ICD-10-CM    1  Closed traumatic nondisplaced fracture of distal end of right radius with routine healing, subsequent encounter S53 501D                   Subjective: I am doing better  I just need to get stronger        Objective: See treatment diary below      Manual               graston wrist              Grade 1 MOB  3m             MFR 2m                                                           Exercise Diary               Wrist ROM x10             P/S x10             Powerweb  Black x10             Pinch pins Black x3             Wrist maze              Wrist ROM w/cone #4 3x10             digi flex Green bands x3             P/s therabar Green x30             theraband row Green 3x10             Theraband ext Green 3x10             Biceps curl 7 5 KB x30             Box lift  15# 3x10                                                                                                                                 Modalities               MH pre 10m             fluido                                    Manual  5/31 6/6 6/8 6/14 6/15 6/27 7/3 7/6 7/9 7/12 7/18   graston wrist 3m 3m 3  3 min 3m 3m 3m 3m 3m    Grade 1 MOB  3m Grade2  3M Grade2  3m  3 min Grade 3  3m Grade 3  3m 3m 3m 3m 3min   MFR  2m 2m  2min 2m 2m 2m 2m 2m 2min                                                 Exercise Diary  5/31 6/6 6/8 6/14 6/15 6/27 7/3 7/6 7/10 7/12  7/18   Wrist ROM x10 x10 x10 X 10 x10 X10 x10 x10 x10 x10 x10   P/S x10 x10 x10 x10 x10 x10 x10 x10 x10 x10 x10   Powerweb  Red  3x10 Green  3x10 [id number] Black  3x 01 Black x30 Black   x30 Black   x30 Black  3x10 Black 3x10 Black   3x10 Black 3x10   Pinch pins Red  3x6 Green  3x6 Green  3x6 Blue/Black 2 x 10 Black/Blue x3 Black  x30 Black  x30 Black 3x10 Black 3x10 Black  3x10 Black 3x10 Wrist maze x5 x5 x5 x5          Wrist ROM w/cone 2x10 3x10 3x10 defrred #2 3x10 3#  3x10 3#  3x10 3#  3x10 3#  3x10 4#  3x10 4# 3x10   digi flex    Green 3 x10          P/s therabar     Red x30 Red   x30 Red  x30 Green  x30 Green x30 Green  x30 Green 30x   theraband row       OR  3x10 Green  3x10 Green  3x10 Green  3x10    Theraband ext       OR  3x10 Green  3x10 Green 3x10 Green  3x10    Biceps curl        5#  3x10 7#  3x10 7 5#kb  3x10    Box lift         14#  3x10 15 #  3x10 15#  3x10                                                                                        6/8                                Modalities  5/31 5/8   6/15 6/27 7/3 7/6 7/10 7/12 7/18   MH pre 10m     10m        fluido  10m 10  10 min  10m 10m 10m 10m 10min                             Assessment: Tolerated treatment well  Patient reports improving functional use of RUE for ADLs and work tasks, but strength is still limited  Noted extrinsic extensor tightness  Plan: Progress treatment as tolerated  Continue for strengthening

## 2018-07-25 ENCOUNTER — APPOINTMENT (OUTPATIENT)
Dept: OCCUPATIONAL THERAPY | Facility: CLINIC | Age: 23
End: 2018-07-25
Payer: COMMERCIAL

## 2018-08-01 ENCOUNTER — APPOINTMENT (OUTPATIENT)
Dept: OCCUPATIONAL THERAPY | Facility: CLINIC | Age: 23
End: 2018-08-01
Payer: COMMERCIAL

## 2018-08-03 ENCOUNTER — OFFICE VISIT (OUTPATIENT)
Dept: OCCUPATIONAL THERAPY | Facility: CLINIC | Age: 23
End: 2018-08-03
Payer: COMMERCIAL

## 2018-08-03 DIAGNOSIS — S52.501D CLOSED TRAUMATIC NONDISPLACED FRACTURE OF DISTAL END OF RIGHT RADIUS WITH ROUTINE HEALING, SUBSEQUENT ENCOUNTER: Primary | ICD-10-CM

## 2018-08-03 PROCEDURE — G8992 OTHER PT/OT  D/C STATUS: HCPCS | Performed by: OCCUPATIONAL THERAPIST

## 2018-08-03 PROCEDURE — G8991 OTHER PT/OT GOAL STATUS: HCPCS | Performed by: OCCUPATIONAL THERAPIST

## 2018-08-03 PROCEDURE — 97110 THERAPEUTIC EXERCISES: CPT | Performed by: OCCUPATIONAL THERAPIST

## 2018-08-03 PROCEDURE — 97140 MANUAL THERAPY 1/> REGIONS: CPT | Performed by: OCCUPATIONAL THERAPIST

## 2018-08-03 PROCEDURE — 97010 HOT OR COLD PACKS THERAPY: CPT | Performed by: OCCUPATIONAL THERAPIST

## 2018-08-03 NOTE — PROGRESS NOTES
Daily Note     Today's date: 8/3/2018  Patient name: Karen Rodriguez  : 1995  MRN: [id number]  Referring provider: Roberto Stevenson MD  Dx:   Encounter Diagnosis     ICD-10-CM    1  Closed traumatic nondisplaced fracture of distal end of right radius with routine healing, subsequent encounter S52 501D                   Subjective: I can do everything now        Objective: See treatment diary below      Manual  7/24 8/3            graston wrist  4min            Grade 1 MOB  3m 4min            MFR 2m                                                           Exercise Diary  7/24 8/3            Wrist ROM x10 x10            P/S x10 x10            Powerweb  Black x10 Black x10            Pinch pins Black x3 Black x3            Wrist maze              Wrist ROM w/cone #4 3x10 #4 3x10            digi flex Green bands x3 Green bands x3            P/s therabar Green x30 Green x30            theraband row Green 3x10             Theraband ext Green 3x10             Biceps curl 7 5 KB x30             Box lift  15# 3x10 15# x30                                                                                                                                Modalities  7/24 8/3            MH pre 10m 10 min            fluido                                    Manual  5/31 6/6 6/8 6/14 6/15 6/27 7/3 7/6 7/9 7/12 7/18   graston wrist 3m 3m 3  3 min 3m 3m 3m 3m 3m    Grade 1 MOB  3m Grade2  3M Grade2  3m  3 min Grade 3  3m Grade 3  3m 3m 3m 3m 3min   MFR  2m 2m  2min 2m 2m 2m 2m 2m 2min                                                 Exercise Diary  5/31 6/6 6/8 6/14 6/15 6/27 7/3 7/6 7/10 7/12  7/18   Wrist ROM x10 x10 x10 X 10 x10 X10 x10 x10 x10 x10 x10   P/S x10 x10 x10 x10 x10 x10 x10 x10 x10 x10 x10   Powerweb  Red  3x10 Green  3x10 [id number] Black  3x 01 Black x30 Black   x30 Black   x30 Black  3x10 Black 3x10 Black   3x10 Black 3x10   Pinch pins Red  3x6 Green  3x6 Green  3x6 Blue/Black 2 x 10 Black/Blue x3 Black  x30 Black  x30 Black 3x10 Black 3x10 Black  3x10 Black 3x10   Wrist maze x5 x5 x5 x5          Wrist ROM w/cone 2x10 3x10 3x10 defrred #2 3x10 3#  3x10 3#  3x10 3#  3x10 3#  3x10 4#  3x10 4# 3x10   digi flex    Green 3 x10          P/s therabar     Red x30 Red   x30 Red  x30 Green  x30 Green x30 Green  x30 Green 30x   theraband row       OR  3x10 Green  3x10 Green  3x10 Green  3x10    Theraband ext       OR  3x10 Green  3x10 Green 3x10 Green  3x10    Biceps curl        5#  3x10 7#  3x10 7 5#kb  3x10    Box lift         14#  3x10 15 #  3x10 15#  3x10                                                                                        6/8                                Modalities  5/31 5/8   6/15 6/27 7/3 7/6 7/10 7/12 7/18    pre 10m     10m        fluido  10m 10  10 min  10m 10m 10m 10m 10min                             Assessment: Tolerated treatment well  ROM and functional strength is WNLS  WF 52degrees, WE 70degrees,  strength R 50lbs  Pt  Has met goals with tx, no further skilled OT recommended at this time        Plan: Continue with HEP

## 2020-12-03 ENCOUNTER — OFFICE VISIT (OUTPATIENT)
Dept: FAMILY MEDICINE CLINIC | Facility: CLINIC | Age: 25
End: 2020-12-03
Payer: COMMERCIAL

## 2020-12-03 VITALS
HEIGHT: 64 IN | SYSTOLIC BLOOD PRESSURE: 124 MMHG | WEIGHT: 127 LBS | DIASTOLIC BLOOD PRESSURE: 74 MMHG | TEMPERATURE: 99.4 F | HEART RATE: 78 BPM | BODY MASS INDEX: 21.68 KG/M2

## 2020-12-03 DIAGNOSIS — Z12.4 SCREENING FOR CERVICAL CANCER: ICD-10-CM

## 2020-12-03 DIAGNOSIS — Z00.00 ANNUAL PHYSICAL EXAM: Primary | ICD-10-CM

## 2020-12-03 DIAGNOSIS — K59.1 FUNCTIONAL DIARRHEA: ICD-10-CM

## 2020-12-03 DIAGNOSIS — Z13.6 SCREENING FOR CARDIOVASCULAR CONDITION: ICD-10-CM

## 2020-12-03 DIAGNOSIS — Z13.1 SCREENING FOR DIABETES MELLITUS: ICD-10-CM

## 2020-12-03 DIAGNOSIS — Z23 ENCOUNTER FOR IMMUNIZATION: ICD-10-CM

## 2020-12-03 DIAGNOSIS — R00.2 PALPITATIONS: ICD-10-CM

## 2020-12-03 DIAGNOSIS — R42 DIZZINESS: ICD-10-CM

## 2020-12-03 DIAGNOSIS — Z11.4 SCREENING FOR HIV (HUMAN IMMUNODEFICIENCY VIRUS): ICD-10-CM

## 2020-12-03 PROCEDURE — 1036F TOBACCO NON-USER: CPT | Performed by: FAMILY MEDICINE

## 2020-12-03 PROCEDURE — 90471 IMMUNIZATION ADMIN: CPT | Performed by: FAMILY MEDICINE

## 2020-12-03 PROCEDURE — 3725F SCREEN DEPRESSION PERFORMED: CPT | Performed by: FAMILY MEDICINE

## 2020-12-03 PROCEDURE — 3008F BODY MASS INDEX DOCD: CPT | Performed by: FAMILY MEDICINE

## 2020-12-03 PROCEDURE — 99385 PREV VISIT NEW AGE 18-39: CPT | Performed by: FAMILY MEDICINE

## 2020-12-03 PROCEDURE — 90715 TDAP VACCINE 7 YRS/> IM: CPT | Performed by: FAMILY MEDICINE

## 2020-12-03 RX ORDER — SPIRONOLACTONE 100 MG/1
100 TABLET, FILM COATED ORAL DAILY
COMMUNITY
Start: 2020-09-29

## 2021-02-24 LAB
ALBUMIN SERPL-MCNC: 4.8 G/DL (ref 3.9–5)
ALBUMIN/GLOB SERPL: 1.8 {RATIO} (ref 1.2–2.2)
ALP SERPL-CCNC: 44 IU/L (ref 39–117)
ALT SERPL-CCNC: 12 IU/L (ref 0–32)
APPEARANCE UR: CLEAR
AST SERPL-CCNC: 14 IU/L (ref 0–40)
BACTERIA URNS QL MICRO: ABNORMAL
BASOPHILS # BLD AUTO: 0 X10E3/UL (ref 0–0.2)
BASOPHILS NFR BLD AUTO: 1 %
BILIRUB SERPL-MCNC: 0.5 MG/DL (ref 0–1.2)
BILIRUB UR QL STRIP: NEGATIVE
BUN SERPL-MCNC: 11 MG/DL (ref 6–20)
BUN/CREAT SERPL: 13 (ref 9–23)
CALCIUM SERPL-MCNC: 9.7 MG/DL (ref 8.7–10.2)
CHLORIDE SERPL-SCNC: 105 MMOL/L (ref 96–106)
CHOLEST SERPL-MCNC: 171 MG/DL (ref 100–199)
CHOLEST/HDLC SERPL: 2.3 RATIO (ref 0–4.4)
CO2 SERPL-SCNC: 23 MMOL/L (ref 20–29)
COLOR UR: ABNORMAL
CREAT SERPL-MCNC: 0.85 MG/DL (ref 0.57–1)
ENDOMYSIUM IGA SER QL: NEGATIVE
EOSINOPHIL # BLD AUTO: 0.1 X10E3/UL (ref 0–0.4)
EOSINOPHIL NFR BLD AUTO: 3 %
EPI CELLS #/AREA URNS HPF: ABNORMAL /HPF (ref 0–10)
ERYTHROCYTE [DISTWIDTH] IN BLOOD BY AUTOMATED COUNT: 11.7 % (ref 11.7–15.4)
GLIADIN PEPTIDE IGA SER-ACNC: 3 UNITS (ref 0–19)
GLIADIN PEPTIDE IGG SER-ACNC: 2 UNITS (ref 0–19)
GLOBULIN SER-MCNC: 2.6 G/DL (ref 1.5–4.5)
GLUCOSE SERPL-MCNC: 94 MG/DL (ref 65–99)
GLUCOSE UR QL: NEGATIVE
HCT VFR BLD AUTO: 42 % (ref 34–46.6)
HDLC SERPL-MCNC: 74 MG/DL
HGB BLD-MCNC: 14.5 G/DL (ref 11.1–15.9)
HGB UR QL STRIP: ABNORMAL
HIV 1+2 AB+HIV1 P24 AG SERPL QL IA: NON REACTIVE
IGA SERPL-MCNC: 221 MG/DL (ref 87–352)
IMM GRANULOCYTES # BLD: 0 X10E3/UL (ref 0–0.1)
IMM GRANULOCYTES NFR BLD: 0 %
KETONES UR QL STRIP: NEGATIVE
LDLC SERPL CALC-MCNC: 86 MG/DL (ref 0–99)
LEUKOCYTE ESTERASE UR QL STRIP: NEGATIVE
LYMPHOCYTES # BLD AUTO: 1.6 X10E3/UL (ref 0.7–3.1)
LYMPHOCYTES NFR BLD AUTO: 39 %
MCH RBC QN AUTO: 31.8 PG (ref 26.6–33)
MCHC RBC AUTO-ENTMCNC: 34.5 G/DL (ref 31.5–35.7)
MCV RBC AUTO: 92 FL (ref 79–97)
MICRO URNS: ABNORMAL
MONOCYTES # BLD AUTO: 0.4 X10E3/UL (ref 0.1–0.9)
MONOCYTES NFR BLD AUTO: 9 %
MUCOUS THREADS URNS QL MICRO: PRESENT
NEUTROPHILS # BLD AUTO: 2 X10E3/UL (ref 1.4–7)
NEUTROPHILS NFR BLD AUTO: 48 %
NITRITE UR QL STRIP: NEGATIVE
PH UR STRIP: 7 [PH] (ref 5–7.5)
PLATELET # BLD AUTO: 302 X10E3/UL (ref 150–450)
POTASSIUM SERPL-SCNC: 4.6 MMOL/L (ref 3.5–5.2)
PROT SERPL-MCNC: 7.4 G/DL (ref 6–8.5)
PROT UR QL STRIP: NEGATIVE
RBC # BLD AUTO: 4.56 X10E6/UL (ref 3.77–5.28)
RBC #/AREA URNS HPF: ABNORMAL /HPF (ref 0–2)
SL AMB EGFR AFRICAN AMERICAN: 109 ML/MIN/1.73
SL AMB EGFR NON AFRICAN AMERICAN: 95 ML/MIN/1.73
SL AMB VLDL CHOLESTEROL CALC: 11 MG/DL (ref 5–40)
SODIUM SERPL-SCNC: 140 MMOL/L (ref 134–144)
SP GR UR: 1.01 (ref 1–1.03)
TRIGL SERPL-MCNC: 55 MG/DL (ref 0–149)
TSH SERPL DL<=0.005 MIU/L-ACNC: 1.21 UIU/ML (ref 0.45–4.5)
TTG IGA SER-ACNC: <2 U/ML (ref 0–3)
TTG IGG SER-ACNC: <2 U/ML (ref 0–5)
UROBILINOGEN UR STRIP-ACNC: 0.2 MG/DL (ref 0.2–1)
WBC # BLD AUTO: 4.1 X10E3/UL (ref 3.4–10.8)
WBC #/AREA URNS HPF: ABNORMAL /HPF (ref 0–5)

## 2021-03-01 DIAGNOSIS — R31.9 HEMATURIA, UNSPECIFIED TYPE: Primary | ICD-10-CM

## 2021-03-10 LAB
APPEARANCE UR: CLEAR
BACTERIA URNS QL MICRO: NORMAL
BILIRUB UR QL STRIP: NEGATIVE
COLOR UR: YELLOW
EPI CELLS #/AREA URNS HPF: NORMAL /HPF (ref 0–10)
GLUCOSE UR QL: NEGATIVE
HGB UR QL STRIP: NEGATIVE
KETONES UR QL STRIP: NEGATIVE
LEUKOCYTE ESTERASE UR QL STRIP: NEGATIVE
MICRO URNS: NORMAL
MICRO URNS: NORMAL
NITRITE UR QL STRIP: NEGATIVE
PH UR STRIP: 7.5 [PH] (ref 5–7.5)
PROT UR QL STRIP: NEGATIVE
RBC #/AREA URNS HPF: NORMAL /HPF (ref 0–2)
SP GR UR: 1.01 (ref 1–1.03)
UROBILINOGEN UR STRIP-ACNC: 0.2 MG/DL (ref 0.2–1)
WBC #/AREA URNS HPF: NORMAL /HPF (ref 0–5)

## 2021-05-24 ENCOUNTER — VBI (OUTPATIENT)
Dept: ADMINISTRATIVE | Facility: OTHER | Age: 26
End: 2021-05-24

## 2024-10-29 ENCOUNTER — OFFICE VISIT (OUTPATIENT)
Dept: FAMILY MEDICINE CLINIC | Facility: CLINIC | Age: 29
End: 2024-10-29
Payer: COMMERCIAL

## 2024-10-29 VITALS
WEIGHT: 136.6 LBS | SYSTOLIC BLOOD PRESSURE: 128 MMHG | BODY MASS INDEX: 22.76 KG/M2 | HEART RATE: 92 BPM | HEIGHT: 65 IN | TEMPERATURE: 100.1 F | RESPIRATION RATE: 16 BRPM | OXYGEN SATURATION: 98 % | DIASTOLIC BLOOD PRESSURE: 70 MMHG

## 2024-10-29 DIAGNOSIS — Z13.6 SCREENING FOR CARDIOVASCULAR CONDITION: ICD-10-CM

## 2024-10-29 DIAGNOSIS — F32.0 CURRENT MILD EPISODE OF MAJOR DEPRESSIVE DISORDER, UNSPECIFIED WHETHER RECURRENT (HCC): ICD-10-CM

## 2024-10-29 DIAGNOSIS — K59.1 FUNCTIONAL DIARRHEA: ICD-10-CM

## 2024-10-29 DIAGNOSIS — Z13.1 SCREENING FOR DIABETES MELLITUS: ICD-10-CM

## 2024-10-29 DIAGNOSIS — F41.9 ANXIETY: Primary | ICD-10-CM

## 2024-10-29 PROBLEM — S52.501A CLOSED TRAUMATIC NONDISPLACED FRACTURE OF DISTAL END OF RIGHT RADIUS: Status: RESOLVED | Noted: 2018-04-18 | Resolved: 2024-10-29

## 2024-10-29 PROCEDURE — 99204 OFFICE O/P NEW MOD 45 MIN: CPT | Performed by: FAMILY MEDICINE

## 2024-10-29 RX ORDER — ESCITALOPRAM OXALATE 10 MG/1
10 TABLET ORAL DAILY
Qty: 30 TABLET | Refills: 5 | Status: SHIPPED | OUTPATIENT
Start: 2024-10-29 | End: 2025-04-27

## 2024-10-29 RX ORDER — DOXYCYCLINE HYCLATE 20 MG
1 TABLET ORAL 2 TIMES DAILY
COMMUNITY
Start: 2024-10-03

## 2024-10-29 NOTE — ASSESSMENT & PLAN NOTE
The patient has been experiencing frequent diarrhea which is largely related to her anxiety.  She has undergone a negative GI workup in the past.  We will treat her anxiety first and monitor closely and we will see her back in the office as scheduled.

## 2024-10-29 NOTE — ASSESSMENT & PLAN NOTE
The patient has had issues with anxiety for a long time.  I believe this is contributing to her diarrhea.  She is interested in starting something to help with her anxiety symptoms at this time.  We will start her on the escitalopram 10 mg daily as indicated.  We will monitor closely.  We will see her back in the office as scheduled and she will get the lab work done as ordered.  Orders:    CBC and differential; Future    Comprehensive metabolic panel; Future    LDL cholesterol, direct; Future    Lipid panel; Future    TSH, 3rd generation with Free T4 reflex; Future    escitalopram (LEXAPRO) 10 mg tablet; Take 1 tablet (10 mg total) by mouth daily    CBC and differential    Comprehensive metabolic panel    LDL cholesterol, direct    Lipid panel    TSH, 3rd generation with Free T4 reflex

## 2024-10-29 NOTE — PROGRESS NOTES
Ambulatory Visit  Name: Irma Reyna      : 1995      MRN: [id number]  Encounter Provider: Gricelda Ulloa DO  Encounter Date: 10/29/2024   Encounter department: [facility]    Assessment & Plan  Anxiety  The patient has had issues with anxiety for a long time.  I believe this is contributing to her diarrhea.  She is interested in starting something to help with her anxiety symptoms at this time.  We will start her on the escitalopram 10 mg daily as indicated.  We will monitor closely.  We will see her back in the office as scheduled and she will get the lab work done as ordered.  Orders:    CBC and differential; Future    Comprehensive metabolic panel; Future    LDL cholesterol, direct; Future    Lipid panel; Future    TSH, 3rd generation with Free T4 reflex; Future    escitalopram (LEXAPRO) 10 mg tablet; Take 1 tablet (10 mg total) by mouth daily    CBC and differential    Comprehensive metabolic panel    LDL cholesterol, direct    Lipid panel    TSH, 3rd generation with Free T4 reflex    Current mild episode of major depressive disorder, unspecified whether recurrent (HCC)  The patient is also experiencing some depression symptoms as well.  We will start the patient on the escitalopram as indicated.  We will monitor closely and we will see her back as scheduled.    Orders:    CBC and differential; Future    Comprehensive metabolic panel; Future    LDL cholesterol, direct; Future    Lipid panel; Future    TSH, 3rd generation with Free T4 reflex; Future    escitalopram (LEXAPRO) 10 mg tablet; Take 1 tablet (10 mg total) by mouth daily    CBC and differential    Comprehensive metabolic panel    LDL cholesterol, direct    Lipid panel    TSH, 3rd generation with Free T4 reflex    Functional diarrhea  The patient has been experiencing frequent diarrhea which is largely related to her anxiety.  She has undergone a negative GI workup in the past.  We will treat her anxiety first and  monitor closely and we will see her back in the office as scheduled.       Screening for cardiovascular condition    Orders:    CBC and differential; Future    Comprehensive metabolic panel; Future    LDL cholesterol, direct; Future    Lipid panel; Future    TSH, 3rd generation with Free T4 reflex; Future    CBC and differential    Comprehensive metabolic panel    LDL cholesterol, direct    Lipid panel    TSH, 3rd generation with Free T4 reflex    Screening for diabetes mellitus    Orders:    CBC and differential; Future    Comprehensive metabolic panel; Future    LDL cholesterol, direct; Future    Lipid panel; Future    TSH, 3rd generation with Free T4 reflex; Future    CBC and differential    Comprehensive metabolic panel    LDL cholesterol, direct    Lipid panel    TSH, 3rd generation with Free T4 reflex      Depression Screening and Follow-up Plan: Patient was screened for depression during today's encounter. They screened negative with a PHQ-2 score of 2.      Chief Complaint   Patient presents with    Establish Care     Np, diarrhea from being anxious, long time    Anxiety       History of Present Illness     Irma Reyna is a 28 y.o. female who presents today as a returning patient for evaluation of her anxiety and functional diarrhea.  She was last seen in 2020 and she had some insurance issues. She has had this for years- the diarrhea is with severe anxiety.  She has nausea mostly.  There is no relation to food.  She has been tested for celiac.    She has never seen by GI.  She has chronic low back problems.    She is on doxycycline for dental issues.    She is worried there are dental issues.    She works as a  at a vet office.    She needs a new dermatologist for her acne.    He symptoms wax and wane.    She has trouble sitting down.    She sleeps okay.  There are no suicidal ideations.    She has not seen a therapist.    Her weight is stable.    She eats junk food at time  There is racing  heartbeat and  shortness of breath with the anxiety.    There are certain foods that bother her.    FDLMP- 3 weeks  She just got  3 weeks ago.        Anxiety  Presents for initial visit. Onset was 1 to 5 years ago. The problem has been rapidly worsening. Symptoms include depressed mood, excessive worry, nervous/anxious behavior and palpitations. Patient reports no chest pain, compulsions, confusion, decreased concentration, dizziness, dry mouth, feeling of choking, hyperventilation, impotence, insomnia, irritability, malaise, muscle tension, nausea, obsessions, panic, restlessness, shortness of breath or suicidal ideas.           History obtained from : patient  Review of Systems   Constitutional: Negative.  Negative for irritability.   HENT: Negative.     Eyes: Negative.    Respiratory: Negative.  Negative for shortness of breath.    Cardiovascular:  Positive for palpitations. Negative for chest pain.   Gastrointestinal: Negative.  Negative for nausea.   Endocrine: Negative.    Genitourinary: Negative.  Negative for impotence.   Musculoskeletal: Negative.    Skin: Negative.    Allergic/Immunologic: Negative.    Neurological: Negative.  Negative for dizziness.   Hematological: Negative.    Psychiatric/Behavioral:  Negative for confusion, decreased concentration and suicidal ideas. The patient is nervous/anxious. The patient does not have insomnia.      Medical History Reviewed by provider this encounter:  Tobacco  Allergies  Meds  Problems  Med Hx  Surg Hx  Fam Hx       Current Outpatient Medications on File Prior to Visit   Medication Sig Dispense Refill    doxycycline (PERIOSTAT) 20 MG tablet Take 1 tablet by mouth 2 (two) times a day       No current facility-administered medications on file prior to visit.      Social History     Tobacco Use    Smoking status: Never     Passive exposure: Never    Smokeless tobacco: Never   Vaping Use    Vaping status: Never Used   Substance and Sexual Activity     "Alcohol use: Yes     Alcohol/week: 1.0 standard drink of alcohol     Types: 1 Glasses of wine per week     Comment: 2-3 times a month for dinner    Drug use: No    Sexual activity: Yes     Partners: Male         Objective     /70 (BP Location: Left arm, Patient Position: Sitting, Cuff Size: Standard)   Pulse 92   Temp 100.1 °F (37.8 °C) (Tympanic)   Resp 16   Ht 5' 5.4\" (1.661 m)   Wt 62 kg (136 lb 9.6 oz)   SpO2 98%   BMI 22.45 kg/m²     Physical Exam  Constitutional:       General: She is not in acute distress.     Appearance: Normal appearance. She is well-developed. She is not diaphoretic.   HENT:      Head: Normocephalic and atraumatic.      Right Ear: Tympanic membrane, ear canal and external ear normal.      Left Ear: Tympanic membrane, ear canal and external ear normal.      Nose: Nose normal.      Mouth/Throat:      Mouth: Mucous membranes are moist.      Pharynx: Oropharynx is clear. No oropharyngeal exudate.   Eyes:      General: No scleral icterus.        Right eye: No discharge.         Left eye: No discharge.      Extraocular Movements: Extraocular movements intact.      Pupils: Pupils are equal, round, and reactive to light.   Neck:      Thyroid: No thyromegaly.      Vascular: No JVD.      Trachea: No tracheal deviation.   Cardiovascular:      Rate and Rhythm: Normal rate and regular rhythm.      Heart sounds: Normal heart sounds. No murmur heard.     No friction rub. No gallop.   Pulmonary:      Effort: Pulmonary effort is normal. No respiratory distress.      Breath sounds: Normal breath sounds. No wheezing or rales.   Chest:      Chest wall: No tenderness.   Abdominal:      General: Bowel sounds are normal. There is no distension.      Palpations: Abdomen is soft. There is no mass.      Tenderness: There is no abdominal tenderness. There is no guarding or rebound.      Hernia: No hernia is present.   Musculoskeletal:         General: No tenderness or deformity. Normal range of motion. "      Cervical back: Normal range of motion and neck supple.   Lymphadenopathy:      Cervical: No cervical adenopathy.   Skin:     General: Skin is warm and dry.      Coloration: Skin is not pale.      Findings: No erythema or rash.   Neurological:      Mental Status: She is alert and oriented to person, place, and time.      Cranial Nerves: No cranial nerve deficit.      Sensory: No sensory deficit.      Motor: No abnormal muscle tone.      Coordination: Coordination normal.      Deep Tendon Reflexes: Reflexes normal.   Psychiatric:         Mood and Affect: Mood normal.         Behavior: Behavior normal.         Thought Content: Thought content normal.         Judgment: Judgment normal.       Administrative Statements   I have spent a total time of 40 minutes in caring for this patient on the day of the visit/encounter including Prognosis, Risks and benefits of tx options, Patient and family education, Importance of tx compliance, Risk factor reductions, Impressions, Documenting in the medical record, Reviewing / ordering tests, medicine, procedures  , and Obtaining or reviewing history  .

## 2024-11-18 ENCOUNTER — TELEMEDICINE (OUTPATIENT)
Dept: FAMILY MEDICINE CLINIC | Facility: CLINIC | Age: 29
End: 2024-11-18
Payer: COMMERCIAL

## 2024-11-18 VITALS — WEIGHT: 136.6 LBS | BODY MASS INDEX: 22.76 KG/M2 | HEIGHT: 65 IN

## 2024-11-18 DIAGNOSIS — F41.9 ANXIETY: Primary | ICD-10-CM

## 2024-11-18 PROCEDURE — 99213 OFFICE O/P EST LOW 20 MIN: CPT | Performed by: FAMILY MEDICINE

## 2024-11-18 NOTE — ASSESSMENT & PLAN NOTE
The patient has noticed a significant improvement in her anxiety since starting the escitalopram 10 mg daily.  She feels that everything is much worse stable with the medication.  She wants to remain on the same dose at this time.  She will report back if there is any worsening in her symptoms.  She will go for the lab work as previously ordered.  We will plan on a follow-up with her in 3 months or sooner as needed.  She feels that this current dose is working well enough for her at this time.

## 2024-11-18 NOTE — PROGRESS NOTES
Virtual Regular Visit  Name: Irma Reyna      : 1995      MRN: [id number]  Encounter Provider: Gricelda Ulloa DO  Encounter Date: 2024   Encounter department: [facility]      Verification of patient location:  Patient is located at Home in the following state in which I hold an active license [place] :  Assessment & Plan  Anxiety  The patient has noticed a significant improvement in her anxiety since starting the escitalopram 10 mg daily.  She feels that everything is much worse stable with the medication.  She wants to remain on the same dose at this time.  She will report back if there is any worsening in her symptoms.  She will go for the lab work as previously ordered.  We will plan on a follow-up with her in 3 months or sooner as needed.  She feels that this current dose is working well enough for her at this time.           Encounter provider Gricelda Ulloa DO    The patient was identified by name and date of birth. Irma Reyna was informed that this is a telemedicine visit and that the visit is being conducted through the Epic Embedded platform. She agrees to proceed..  My office door was closed. No one else was in the room.  She acknowledged consent and understanding of privacy and security of the video platform. The patient has agreed to participate and understands they can discontinue the visit at any time.    Patient is aware this is a billable service.   Chief Complaint   Patient presents with    Follow-up     4 wk     Anxiety     Started Escitalpram 10 mg daily, virtual visit       History of Present Illness     Irma Reyna is a 28 y.o. female who presents today for follow-up by way of a virtual video visit of her anxiety.  She has been taking the Lexapro 10 mg daily since her last visit.  She states that since starting the medication, her anxiety has improved and she does not get as nervous or does not have as many palpitations at stressful times.  She  "denies any depression symptoms.  She states that her diarrhea and nausea have decreased as well since starting the medication.  She had 1 bad episode of nausea the first day of starting the medication but that subsided.  There are decreased panic attacks.  She does not worry about things as much and feels more stable.  She states she has been feeling little bit more tired but wants to definitely continue on the current dose of this medication.  She denies any chest pain or shortness of breath.  Her appetite is normal.      Anxiety  Presents for follow-up visit. Symptoms include nervous/anxious behavior. Patient reports no chest pain, compulsions, confusion, decreased concentration, depressed mood, dizziness, dry mouth, excessive worry, feeling of choking, hyperventilation, impotence, insomnia, irritability, malaise, muscle tension, nausea, obsessions, palpitations, panic, restlessness, shortness of breath or suicidal ideas.         Review of Systems   Constitutional: Negative.  Negative for irritability.   HENT: Negative.     Eyes: Negative.    Respiratory: Negative.  Negative for shortness of breath.    Cardiovascular: Negative.  Negative for chest pain and palpitations.   Gastrointestinal: Negative.  Negative for nausea.   Endocrine: Negative.    Genitourinary: Negative.  Negative for impotence.   Musculoskeletal: Negative.    Skin: Negative.    Allergic/Immunologic: Negative.    Neurological: Negative.  Negative for dizziness.   Hematological: Negative.    Psychiatric/Behavioral:  Negative for confusion, decreased concentration and suicidal ideas. The patient is nervous/anxious. The patient does not have insomnia.        Objective   Ht 5' 5.4\" (1.661 m)   Wt 62 kg (136 lb 9.6 oz)   BMI 22.45 kg/m²     Physical Exam  Constitutional:       General: She is not in acute distress.     Appearance: Normal appearance. She is well-developed. She is not ill-appearing or diaphoretic.   HENT:      Head: Normocephalic and " atraumatic.      Nose: No congestion or rhinorrhea.   Pulmonary:      Effort: Pulmonary effort is normal.      Breath sounds: Normal breath sounds.   Musculoskeletal:         General: Normal range of motion.      Cervical back: Normal range of motion and neck supple.   Neurological:      Mental Status: She is alert and oriented to person, place, and time.   Psychiatric:         Mood and Affect: Mood normal.         Behavior: Behavior normal.         Thought Content: Thought content normal.         Judgment: Judgment normal.         Visit Time  Total Visit Duration: 15

## 2024-11-19 ENCOUNTER — TELEPHONE (OUTPATIENT)
Dept: FAMILY MEDICINE CLINIC | Facility: CLINIC | Age: 29
End: 2024-11-19

## 2024-11-19 NOTE — TELEPHONE ENCOUNTER
----- Message from Gricelda Ulloa DO sent at 11/18/2024  6:58 PM EST -----  Regarding: Follow up  Please contact the patient and schedule her for follow-up with me in 3 months.  We can make this in the office.

## 2025-01-13 ENCOUNTER — HOSPITAL ENCOUNTER (EMERGENCY)
Facility: HOSPITAL | Age: 30
Discharge: HOME | End: 2025-01-13
Attending: EMERGENCY MEDICINE
Payer: COMMERCIAL

## 2025-01-13 VITALS
TEMPERATURE: 97.5 F | HEART RATE: 95 BPM | OXYGEN SATURATION: 99 % | RESPIRATION RATE: 19 BRPM | SYSTOLIC BLOOD PRESSURE: 121 MMHG | DIASTOLIC BLOOD PRESSURE: 65 MMHG

## 2025-01-13 DIAGNOSIS — R11.2 NAUSEA VOMITING AND DIARRHEA: Primary | ICD-10-CM

## 2025-01-13 DIAGNOSIS — R19.7 NAUSEA VOMITING AND DIARRHEA: Primary | ICD-10-CM

## 2025-01-13 LAB
ALBUMIN SERPL BCG-MCNC: 4.5 G/DL (ref 3.5–5)
ALP SERPL-CCNC: 53 U/L (ref 34–104)
ALT SERPL W P-5'-P-CCNC: 18 U/L (ref 7–52)
ANION GAP SERPL CALCULATED.3IONS-SCNC: 8 MMOL/L (ref 4–13)
AST SERPL W P-5'-P-CCNC: 15 U/L (ref 13–39)
BACTERIA UR QL AUTO: NORMAL /HPF
BASOPHILS # BLD AUTO: 0.03 THOUSANDS/ΜL (ref 0–0.1)
BASOPHILS NFR BLD AUTO: 0 % (ref 0–1)
BILIRUB SERPL-MCNC: 1.16 MG/DL (ref 0.2–1)
BILIRUB UR QL STRIP: NEGATIVE
BUN SERPL-MCNC: 17 MG/DL (ref 5–25)
CALCIUM SERPL-MCNC: 9.3 MG/DL (ref 8.4–10.2)
CARDIAC TROPONIN I PNL SERPL HS: <2 NG/L (ref ?–50)
CHLORIDE SERPL-SCNC: 105 MMOL/L (ref 96–108)
CLARITY UR: CLEAR
CO2 SERPL-SCNC: 24 MMOL/L (ref 21–32)
COLOR UR: YELLOW
CREAT SERPL-MCNC: 0.74 MG/DL (ref 0.6–1.3)
EOSINOPHIL # BLD AUTO: 0.03 THOUSAND/ΜL (ref 0–0.61)
EOSINOPHIL NFR BLD AUTO: 0 % (ref 0–6)
ERYTHROCYTE [DISTWIDTH] IN BLOOD BY AUTOMATED COUNT: 11.4 % (ref 11.6–15.1)
EXT PREGNANCY TEST URINE: NEGATIVE
EXT. CONTROL: NORMAL
GFR SERPL CREATININE-BSD FRML MDRD: 109 ML/MIN/1.73SQ M
GLUCOSE SERPL-MCNC: 118 MG/DL (ref 65–140)
GLUCOSE UR STRIP-MCNC: NEGATIVE MG/DL
HCT VFR BLD AUTO: 43 % (ref 34.8–46.1)
HGB BLD-MCNC: 14.7 G/DL (ref 11.5–15.4)
HGB UR QL STRIP.AUTO: NEGATIVE
IMM GRANULOCYTES # BLD AUTO: 0.07 THOUSAND/UL (ref 0–0.2)
IMM GRANULOCYTES NFR BLD AUTO: 1 % (ref 0–2)
KETONES UR STRIP-MCNC: ABNORMAL MG/DL
LEUKOCYTE ESTERASE UR QL STRIP: NEGATIVE
LIPASE SERPL-CCNC: 14 U/L (ref 11–82)
LYMPHOCYTES # BLD AUTO: 0.19 THOUSANDS/ΜL (ref 0.6–4.47)
LYMPHOCYTES NFR BLD AUTO: 2 % (ref 14–44)
MCH RBC QN AUTO: 31.7 PG (ref 26.8–34.3)
MCHC RBC AUTO-ENTMCNC: 34.2 G/DL (ref 31.4–37.4)
MCV RBC AUTO: 93 FL (ref 82–98)
MONOCYTES # BLD AUTO: 0.61 THOUSAND/ΜL (ref 0.17–1.22)
MONOCYTES NFR BLD AUTO: 5 % (ref 4–12)
NEUTROPHILS # BLD AUTO: 10.63 THOUSANDS/ΜL (ref 1.85–7.62)
NEUTS SEG NFR BLD AUTO: 92 % (ref 43–75)
NITRITE UR QL STRIP: NEGATIVE
NON-SQ EPI CELLS URNS QL MICRO: NORMAL /HPF
NRBC BLD AUTO-RTO: 0 /100 WBCS
PH UR STRIP.AUTO: 8 [PH]
PLATELET # BLD AUTO: 336 THOUSANDS/UL (ref 149–390)
PLATELET BLD QL SMEAR: ADEQUATE
PMV BLD AUTO: 9.2 FL (ref 8.9–12.7)
POTASSIUM SERPL-SCNC: 3.8 MMOL/L (ref 3.5–5.3)
PROT SERPL-MCNC: 7.5 G/DL (ref 6.4–8.4)
PROT UR STRIP-MCNC: ABNORMAL MG/DL
RBC # BLD AUTO: 4.63 MILLION/UL (ref 3.81–5.12)
RBC #/AREA URNS AUTO: NORMAL /HPF
RBC MORPH BLD: NORMAL
SODIUM SERPL-SCNC: 137 MMOL/L (ref 135–147)
SP GR UR STRIP.AUTO: 1.02 (ref 1–1.03)
UROBILINOGEN UR STRIP-ACNC: <2 MG/DL
WBC # BLD AUTO: 11.56 THOUSAND/UL (ref 4.31–10.16)
WBC #/AREA URNS AUTO: NORMAL /HPF

## 2025-01-13 PROCEDURE — 96375 TX/PRO/DX INJ NEW DRUG ADDON: CPT

## 2025-01-13 PROCEDURE — 96374 THER/PROPH/DIAG INJ IV PUSH: CPT

## 2025-01-13 PROCEDURE — 81001 URINALYSIS AUTO W/SCOPE: CPT | Performed by: EMERGENCY MEDICINE

## 2025-01-13 PROCEDURE — 85025 COMPLETE CBC W/AUTO DIFF WBC: CPT | Performed by: EMERGENCY MEDICINE

## 2025-01-13 PROCEDURE — 99284 EMERGENCY DEPT VISIT MOD MDM: CPT

## 2025-01-13 PROCEDURE — 93005 ELECTROCARDIOGRAM TRACING: CPT

## 2025-01-13 PROCEDURE — 96361 HYDRATE IV INFUSION ADD-ON: CPT

## 2025-01-13 PROCEDURE — 84484 ASSAY OF TROPONIN QUANT: CPT | Performed by: EMERGENCY MEDICINE

## 2025-01-13 PROCEDURE — 81025 URINE PREGNANCY TEST: CPT | Performed by: EMERGENCY MEDICINE

## 2025-01-13 PROCEDURE — 99284 EMERGENCY DEPT VISIT MOD MDM: CPT | Performed by: EMERGENCY MEDICINE

## 2025-01-13 PROCEDURE — 36415 COLL VENOUS BLD VENIPUNCTURE: CPT | Performed by: EMERGENCY MEDICINE

## 2025-01-13 PROCEDURE — 80053 COMPREHEN METABOLIC PANEL: CPT | Performed by: EMERGENCY MEDICINE

## 2025-01-13 PROCEDURE — 83690 ASSAY OF LIPASE: CPT | Performed by: EMERGENCY MEDICINE

## 2025-01-13 RX ORDER — HYDROMORPHONE HCL/PF 1 MG/ML
0.5 SYRINGE (ML) INJECTION ONCE
Refills: 0 | Status: COMPLETED | OUTPATIENT
Start: 2025-01-13 | End: 2025-01-13

## 2025-01-13 RX ORDER — ONDANSETRON 2 MG/ML
4 INJECTION INTRAMUSCULAR; INTRAVENOUS ONCE
Status: COMPLETED | OUTPATIENT
Start: 2025-01-13 | End: 2025-01-13

## 2025-01-13 RX ORDER — ONDANSETRON 4 MG/1
4 TABLET, FILM COATED ORAL EVERY 6 HOURS
Qty: 12 TABLET | Refills: 0 | Status: SHIPPED | OUTPATIENT
Start: 2025-01-13

## 2025-01-13 RX ORDER — KETOROLAC TROMETHAMINE 30 MG/ML
15 INJECTION, SOLUTION INTRAMUSCULAR; INTRAVENOUS ONCE
Status: COMPLETED | OUTPATIENT
Start: 2025-01-13 | End: 2025-01-13

## 2025-01-13 RX ADMIN — KETOROLAC TROMETHAMINE 15 MG: 30 INJECTION, SOLUTION INTRAMUSCULAR; INTRAVENOUS at 10:03

## 2025-01-13 RX ADMIN — ONDANSETRON 4 MG: 2 INJECTION, SOLUTION INTRAMUSCULAR; INTRAVENOUS at 10:04

## 2025-01-13 RX ADMIN — SODIUM CHLORIDE 1000 ML: 0.9 INJECTION, SOLUTION INTRAVENOUS at 10:02

## 2025-01-13 RX ADMIN — HYDROMORPHONE HYDROCHLORIDE 0.5 MG: 1 INJECTION, SOLUTION INTRAMUSCULAR; INTRAVENOUS; SUBCUTANEOUS at 11:50

## 2025-01-13 NOTE — ED PROVIDER NOTES
Time reflects when diagnosis was documented in both MDM as applicable and the Disposition within this note       Time User Action Codes Description Comment    1/13/2025 12:11 PM Baltazar Saba Add [R11.2,  R19.7] Nausea vomiting and diarrhea           ED Disposition       ED Disposition   Discharge    Condition   Stable    Date/Time   Mon Jan 13, 2025 12:11 PM    Comment   Irma Reyna discharge to home/self care.                   Assessment & Plan       Medical Decision Making  29-year-old female presenting with vomiting and diarrhea that started a few hours ago.  Suspect gastroenteritis given symptoms.  Obtain EKG, labs to rule out ACS given vomiting although low suspicion.  Also assess for electrolyte abnormalities, dehydration.  Symptom control as needed.    Upon reassessment, patient with significant treatment of symptoms.  Has not vomited while in ED.  Prescription for Zofran sent to pharmacy.  Return precautions discussed.    Amount and/or Complexity of Data Reviewed  Labs: ordered.    Risk  Prescription drug management.             Medications   ondansetron (ZOFRAN) injection 4 mg (4 mg Intravenous Given 1/13/25 1004)   sodium chloride 0.9 % bolus 1,000 mL (0 mL Intravenous Stopped 1/13/25 1102)   ketorolac (TORADOL) injection 15 mg (15 mg Intravenous Given 1/13/25 1003)   HYDROmorphone (DILAUDID) injection 0.5 mg (0.5 mg Intravenous Given 1/13/25 1150)       ED Risk Strat Scores                                              History of Present Illness       Chief Complaint   Patient presents with    Vomiting     Pt to ED from home for vomiting and diarrhea that started at 0100 today.        Past Medical History:   Diagnosis Date    Closed traumatic nondisplaced fracture of distal end of right radius 04/18/2018      Past Surgical History:   Procedure Laterality Date    TONSILLECTOMY        Family History   Problem Relation Age of Onset    Diabetes Father       Social History     Tobacco Use    Smoking status:  Never     Passive exposure: Never    Smokeless tobacco: Never   Vaping Use    Vaping status: Never Used   Substance Use Topics    Alcohol use: Yes     Alcohol/week: 1.0 standard drink of alcohol     Types: 1 Glasses of wine per week     Comment: 2-3 times a month for dinner    Drug use: No      E-Cigarette/Vaping    E-Cigarette Use Never User       E-Cigarette/Vaping Substances    Nicotine No     THC No     CBD No     Flavoring No     Other No     Unknown No       I have reviewed and agree with the history as documented.     29-year-old female presenting with vomiting and diarrhea that started last night.  Associated abdominal pain only when vomiting.  Denies any associated blood.  No recent travel.  Doxycycline for dental infection 1.5 months ago.        Review of Systems   Constitutional:  Negative for fever.   Gastrointestinal:  Positive for diarrhea, nausea and vomiting.           Objective       ED Triage Vitals   Temperature Pulse Blood Pressure Respirations SpO2 Patient Position - Orthostatic VS   01/13/25 0931 01/13/25 0930 01/13/25 0930 01/13/25 0930 01/13/25 0930 01/13/25 0930   97.5 °F (36.4 °C) 80 107/66 18 99 % Sitting      Temp Source Heart Rate Source BP Location FiO2 (%) Pain Score    01/13/25 0931 01/13/25 0930 01/13/25 0930 -- 01/13/25 1003    Oral Monitor Right arm  8      Vitals      Date and Time Temp Pulse SpO2 Resp BP Pain Score FACES Pain Rating User   01/13/25 1217 -- -- -- -- -- No Pain -- BY   01/13/25 1150 -- -- -- -- -- 8 -- BY   01/13/25 1130 -- 95 99 % 19 121/65 -- -- BY   01/13/25 1100 -- 87 100 % 16 114/62 -- -- BY   01/13/25 1030 -- 85 100 % 17 115/56 -- -- BY   01/13/25 1003 -- -- -- -- -- 8 -- BY   01/13/25 0945 -- 75 100 % -- 116/58 -- -- MB   01/13/25 0931 97.5 °F (36.4 °C) -- -- -- -- -- -- ML   01/13/25 0930 -- 80 99 % 18 107/66 -- -- ML            Physical Exam  Vitals and nursing note reviewed.   Constitutional:       Appearance: She is well-developed.   HENT:      Head:  Normocephalic and atraumatic.      Right Ear: External ear normal.      Left Ear: External ear normal.      Nose: Nose normal.   Eyes:      General: No scleral icterus.  Cardiovascular:      Rate and Rhythm: Normal rate.   Pulmonary:      Effort: Pulmonary effort is normal. No respiratory distress.   Abdominal:      General: There is no distension.      Tenderness: There is no abdominal tenderness. There is no guarding.   Musculoskeletal:         General: No deformity. Normal range of motion.      Cervical back: Normal range of motion.   Skin:     Findings: No rash.   Neurological:      General: No focal deficit present.      Mental Status: She is alert and oriented to person, place, and time.   Psychiatric:         Mood and Affect: Mood normal.         Results Reviewed       Procedure Component Value Units Date/Time    Urine Microscopic [[id number]]  (Normal) Collected: 01/13/25 1004    Lab Status: Final result Specimen: Urine, Clean Catch Updated: 01/13/25 1054     RBC, UA None Seen /hpf      WBC, UA None Seen /hpf      Epithelial Cells None Seen /hpf      Bacteria, UA None Seen /hpf     UA w Reflex to Microscopic w Reflex to Culture [[id number]]  (Abnormal) Collected: 01/13/25 1004    Lab Status: Final result Specimen: Urine, Clean Catch Updated: 01/13/25 1054     Color, UA Yellow     Clarity, UA Clear     Specific Gravity, UA 1.020     pH, UA 8.0     Leukocytes, UA Negative     Nitrite, UA Negative     Protein, UA 30 (1+) mg/dl      Glucose, UA Negative mg/dl      Ketones, UA 10 (1+) mg/dl      Urobilinogen, UA <2.0 mg/dl      Bilirubin, UA Negative     Occult Blood, UA Negative    CBC and differential [[id number]]  (Abnormal) Collected: 01/13/25 0956    Lab Status: Final result Specimen: Blood from Arm, Right Updated: 01/13/25 1027     WBC 11.56 Thousand/uL      RBC 4.63 Million/uL      Hemoglobin 14.7 g/dL      Hematocrit 43.0 %      MCV 93 fL      MCH 31.7 pg      MCHC 34.2 g/dL      RDW 11.4 %      MPV 9.2 fL       Platelets 336 Thousands/uL      nRBC 0 /100 WBCs      Segmented % 92 %      Immature Grans % 1 %      Lymphocytes % 2 %      Monocytes % 5 %      Eosinophils Relative 0 %      Basophils Relative 0 %      Absolute Neutrophils 10.63 Thousands/µL      Absolute Immature Grans 0.07 Thousand/uL      Absolute Lymphocytes 0.19 Thousands/µL      Absolute Monocytes 0.61 Thousand/µL      Eosinophils Absolute 0.03 Thousand/µL      Basophils Absolute 0.03 Thousands/µL     Narrative:      This is an appended report.  These results have been appended to a previously verified report.    Smear Review(Phlebs Do Not Order) [[id number]] Collected: 01/13/25 0956    Lab Status: Final result Specimen: Blood from Arm, Right Updated: 01/13/25 1027     RBC Morphology Normal     Platelet Estimate Adequate    HS Troponin 0hr (reflex protocol) [[id number]]  (Normal) Collected: 01/13/25 0956    Lab Status: Final result Specimen: Blood from Arm, Right Updated: 01/13/25 1023     hs TnI 0hr <2 ng/L     CMP [[id number]]  (Abnormal) Collected: 01/13/25 0956    Lab Status: Final result Specimen: Blood from Arm, Right Updated: 01/13/25 1015     Sodium 137 mmol/L      Potassium 3.8 mmol/L      Chloride 105 mmol/L      CO2 24 mmol/L      ANION GAP 8 mmol/L      BUN 17 mg/dL      Creatinine 0.74 mg/dL      Glucose 118 mg/dL      Calcium 9.3 mg/dL      AST 15 U/L      ALT 18 U/L      Alkaline Phosphatase 53 U/L      Total Protein 7.5 g/dL      Albumin 4.5 g/dL      Total Bilirubin 1.16 mg/dL      eGFR 109 ml/min/1.73sq m     Narrative:      [employer] guidelines for Chronic Kidney Disease (CKD):     Stage 1 with normal or high GFR (GFR > 90 mL/min/1.73 square meters)    Stage 2 Mild CKD (GFR = 60-89 mL/min/1.73 square meters)    Stage 3A Moderate CKD (GFR = 45-59 mL/min/1.73 square meters)    Stage 3B Moderate CKD (GFR = 30-44 mL/min/1.73 square meters)    Stage 4 Severe CKD (GFR = 15-29 mL/min/1.73 square meters)    Stage 5 End  Stage CKD (GFR <15 mL/min/1.73 square meters)  Note: GFR calculation is accurate only with a steady state creatinine    Lipase [[id number]]  (Normal) Collected: 01/13/25 0956    Lab Status: Final result Specimen: Blood from Arm, Right Updated: 01/13/25 1015     Lipase 14 u/L     POCT pregnancy, urine [[id number]]  (Normal) Collected: 01/13/25 1000    Lab Status: Final result Specimen: Urine Updated: 01/13/25 1000     EXT Preg Test, Ur Negative     Control Valid            No orders to display       Procedures    ED Medication and Procedure Management   Prior to Admission Medications   Prescriptions Last Dose Informant Patient Reported? Taking?   doxycycline (PERIOSTAT) 20 MG tablet  Self Yes No   Sig: Take 1 tablet by mouth 2 (two) times a day   escitalopram (LEXAPRO) 10 mg tablet  Self No No   Sig: Take 1 tablet (10 mg total) by mouth daily      Facility-Administered Medications: None     Discharge Medication List as of 1/13/2025 12:11 PM        START taking these medications    Details   ondansetron (ZOFRAN) 4 mg tablet Take 1 tablet (4 mg total) by mouth every 6 (six) hours, Starting Mon 1/13/2025, Normal           CONTINUE these medications which have NOT CHANGED    Details   doxycycline (PERIOSTAT) 20 MG tablet Take 1 tablet by mouth 2 (two) times a day, Starting Thu 10/3/2024, Historical Med      escitalopram (LEXAPRO) 10 mg tablet Take 1 tablet (10 mg total) by mouth daily, Starting Tue 10/29/2024, Until Sun 4/27/2025, Normal           No discharge procedures on file.  ED SEPSIS DOCUMENTATION   Time reflects when diagnosis was documented in both MDM as applicable and the Disposition within this note       Time User Action Codes Description Comment    1/13/2025 12:11 PM Baltazar Saba Add [R11.2,  R19.7] Nausea vomiting and diarrhea                  Baltazar Saba DO  01/13/25 1941

## 2025-01-15 LAB
ATRIAL RATE: 71 BPM
P AXIS: 76 DEGREES
PR INTERVAL: 148 MS
QRS AXIS: 82 DEGREES
QRSD INTERVAL: 88 MS
QT INTERVAL: 408 MS
QTC INTERVAL: 443 MS
T WAVE AXIS: 76 DEGREES
VENTRICULAR RATE: 71 BPM

## 2025-01-15 PROCEDURE — 93010 ELECTROCARDIOGRAM REPORT: CPT | Performed by: INTERNAL MEDICINE

## 2025-02-16 LAB
ALBUMIN SERPL-MCNC: 4.6 G/DL (ref 4–5)
ALP SERPL-CCNC: 60 IU/L (ref 44–121)
ALT SERPL-CCNC: 16 IU/L (ref 0–32)
AST SERPL-CCNC: 18 IU/L (ref 0–40)
BASOPHILS # BLD AUTO: 0 X10E3/UL (ref 0–0.2)
BASOPHILS NFR BLD AUTO: 1 %
BILIRUB SERPL-MCNC: 0.4 MG/DL (ref 0–1.2)
BUN SERPL-MCNC: 16 MG/DL (ref 6–20)
BUN/CREAT SERPL: 21 (ref 9–23)
CALCIUM SERPL-MCNC: 9.3 MG/DL (ref 8.7–10.2)
CHLORIDE SERPL-SCNC: 105 MMOL/L (ref 96–106)
CHOLEST SERPL-MCNC: 158 MG/DL (ref 100–199)
CHOLEST/HDLC SERPL: 2.5 RATIO (ref 0–4.4)
CO2 SERPL-SCNC: 21 MMOL/L (ref 20–29)
CREAT SERPL-MCNC: 0.78 MG/DL (ref 0.57–1)
EGFR: 105 ML/MIN/1.73
EOSINOPHIL # BLD AUTO: 0.2 X10E3/UL (ref 0–0.4)
EOSINOPHIL NFR BLD AUTO: 4 %
ERYTHROCYTE [DISTWIDTH] IN BLOOD BY AUTOMATED COUNT: 11.7 % (ref 11.7–15.4)
GLOBULIN SER-MCNC: 2.1 G/DL (ref 1.5–4.5)
GLUCOSE SERPL-MCNC: 89 MG/DL (ref 70–99)
HCT VFR BLD AUTO: 40.6 % (ref 34–46.6)
HDLC SERPL-MCNC: 64 MG/DL
HGB BLD-MCNC: 13.6 G/DL (ref 11.1–15.9)
IMM GRANULOCYTES # BLD: 0 X10E3/UL (ref 0–0.1)
IMM GRANULOCYTES NFR BLD: 0 %
LDL CALC COMMENT: NORMAL
LDLC SERPL CALC-MCNC: 85 MG/DL (ref 0–99)
LDLC SERPL DIRECT ASSAY-MCNC: 82 MG/DL (ref 0–99)
LYMPHOCYTES # BLD AUTO: 2 X10E3/UL (ref 0.7–3.1)
LYMPHOCYTES NFR BLD AUTO: 43 %
MCH RBC QN AUTO: 31.4 PG (ref 26.6–33)
MCHC RBC AUTO-ENTMCNC: 33.5 G/DL (ref 31.5–35.7)
MCV RBC AUTO: 94 FL (ref 79–97)
MONOCYTES # BLD AUTO: 0.4 X10E3/UL (ref 0.1–0.9)
MONOCYTES NFR BLD AUTO: 9 %
NEUTROPHILS # BLD AUTO: 2 X10E3/UL (ref 1.4–7)
NEUTROPHILS NFR BLD AUTO: 43 %
PLATELET # BLD AUTO: 297 X10E3/UL (ref 150–450)
POTASSIUM SERPL-SCNC: 4.9 MMOL/L (ref 3.5–5.2)
PROT SERPL-MCNC: 6.7 G/DL (ref 6–8.5)
RBC # BLD AUTO: 4.33 X10E6/UL (ref 3.77–5.28)
SL AMB VLDL CHOLESTEROL CALC: 9 MG/DL (ref 5–40)
SODIUM SERPL-SCNC: 139 MMOL/L (ref 134–144)
TRIGL SERPL-MCNC: 43 MG/DL (ref 0–149)
TSH SERPL DL<=0.005 MIU/L-ACNC: 2.13 UIU/ML (ref 0.45–4.5)
WBC # BLD AUTO: 4.6 X10E3/UL (ref 3.4–10.8)

## 2025-02-17 ENCOUNTER — RESULTS FOLLOW-UP (OUTPATIENT)
Dept: FAMILY MEDICINE CLINIC | Facility: CLINIC | Age: 30
End: 2025-02-17

## 2025-04-24 DIAGNOSIS — F41.9 ANXIETY: ICD-10-CM

## 2025-04-24 DIAGNOSIS — F32.0 CURRENT MILD EPISODE OF MAJOR DEPRESSIVE DISORDER, UNSPECIFIED WHETHER RECURRENT (HCC): ICD-10-CM

## 2025-04-24 RX ORDER — ESCITALOPRAM OXALATE 10 MG/1
10 TABLET ORAL DAILY
Qty: 30 TABLET | Refills: 5 | Status: SHIPPED | OUTPATIENT
Start: 2025-04-24

## 2025-07-02 DIAGNOSIS — F41.9 ANXIETY: ICD-10-CM

## 2025-07-02 DIAGNOSIS — F32.0 CURRENT MILD EPISODE OF MAJOR DEPRESSIVE DISORDER, UNSPECIFIED WHETHER RECURRENT (HCC): ICD-10-CM

## 2025-07-03 DIAGNOSIS — F41.9 ANXIETY: ICD-10-CM

## 2025-07-03 DIAGNOSIS — F32.0 CURRENT MILD EPISODE OF MAJOR DEPRESSIVE DISORDER, UNSPECIFIED WHETHER RECURRENT (HCC): ICD-10-CM

## 2025-07-03 RX ORDER — ESCITALOPRAM OXALATE 10 MG/1
10 TABLET ORAL DAILY
Qty: 30 TABLET | Refills: 0 | OUTPATIENT
Start: 2025-07-03

## 2025-07-03 RX ORDER — ESCITALOPRAM OXALATE 10 MG/1
10 TABLET ORAL DAILY
Qty: 30 TABLET | Refills: 1 | Status: SHIPPED | OUTPATIENT
Start: 2025-07-03

## 2025-07-03 NOTE — TELEPHONE ENCOUNTER
Laquita called and scheduled an appointment. The soonest afternoon appointment she was able to do was for 8/6.     Laquita is asking if it is possible to have a courtesy fill for her meds to get her to that appointment, as she is now out.    Please reach out to Laquita with an update.

## 2025-08-05 ENCOUNTER — TELEPHONE (OUTPATIENT)
Dept: FAMILY MEDICINE CLINIC | Facility: CLINIC | Age: 30
End: 2025-08-05

## 2025-08-06 ENCOUNTER — OFFICE VISIT (OUTPATIENT)
Dept: FAMILY MEDICINE CLINIC | Facility: CLINIC | Age: 30
End: 2025-08-06
Payer: COMMERCIAL

## 2025-08-06 VITALS
HEART RATE: 72 BPM | BODY MASS INDEX: 24.96 KG/M2 | HEIGHT: 65 IN | DIASTOLIC BLOOD PRESSURE: 82 MMHG | RESPIRATION RATE: 16 BRPM | OXYGEN SATURATION: 99 % | WEIGHT: 149.8 LBS | TEMPERATURE: 98.9 F | SYSTOLIC BLOOD PRESSURE: 128 MMHG

## 2025-08-06 DIAGNOSIS — F41.9 ANXIETY: Primary | ICD-10-CM

## 2025-08-06 DIAGNOSIS — F32.0 CURRENT MILD EPISODE OF MAJOR DEPRESSIVE DISORDER, UNSPECIFIED WHETHER RECURRENT (HCC): ICD-10-CM

## 2025-08-06 DIAGNOSIS — K21.9 GASTROESOPHAGEAL REFLUX DISEASE WITHOUT ESOPHAGITIS: ICD-10-CM

## 2025-08-06 PROCEDURE — 99214 OFFICE O/P EST MOD 30 MIN: CPT | Performed by: FAMILY MEDICINE

## 2025-08-06 RX ORDER — OMEPRAZOLE 20 MG/1
20 CAPSULE, DELAYED RELEASE ORAL DAILY
Qty: 30 CAPSULE | Refills: 1 | Status: SHIPPED | OUTPATIENT
Start: 2025-08-06

## 2025-08-06 RX ORDER — ESCITALOPRAM OXALATE 20 MG/1
20 TABLET ORAL DAILY
Qty: 30 TABLET | Refills: 5 | Status: SHIPPED | OUTPATIENT
Start: 2025-08-06

## 2025-08-07 ENCOUNTER — TELEPHONE (OUTPATIENT)
Age: 30
End: 2025-08-07

## 2025-08-08 PROBLEM — R00.2 PALPITATIONS: Status: RESOLVED | Noted: 2020-12-03 | Resolved: 2025-08-08

## 2025-08-08 PROBLEM — R42 DIZZINESS: Status: RESOLVED | Noted: 2020-12-03 | Resolved: 2025-08-08

## 2025-08-15 ENCOUNTER — TELEPHONE (OUTPATIENT)
Age: 30
End: 2025-08-15